# Patient Record
Sex: MALE | Race: WHITE | Employment: FULL TIME | URBAN - NONMETROPOLITAN AREA
[De-identification: names, ages, dates, MRNs, and addresses within clinical notes are randomized per-mention and may not be internally consistent; named-entity substitution may affect disease eponyms.]

---

## 2017-07-17 ENCOUNTER — OFFICE VISIT (OUTPATIENT)
Dept: FAMILY MEDICINE CLINIC | Age: 37
End: 2017-07-17
Payer: COMMERCIAL

## 2017-07-17 VITALS
SYSTOLIC BLOOD PRESSURE: 164 MMHG | WEIGHT: 315 LBS | HEART RATE: 80 BPM | DIASTOLIC BLOOD PRESSURE: 108 MMHG | HEIGHT: 69 IN | BODY MASS INDEX: 46.65 KG/M2

## 2017-07-17 DIAGNOSIS — E66.01 MORBID OBESITY WITH BMI OF 45.0-49.9, ADULT (HCC): ICD-10-CM

## 2017-07-17 DIAGNOSIS — M25.562 ACUTE PAIN OF LEFT KNEE: ICD-10-CM

## 2017-07-17 DIAGNOSIS — I10 ESSENTIAL HYPERTENSION: Primary | ICD-10-CM

## 2017-07-17 PROCEDURE — 99203 OFFICE O/P NEW LOW 30 MIN: CPT | Performed by: FAMILY MEDICINE

## 2017-07-17 RX ORDER — HYDROCHLOROTHIAZIDE 12.5 MG/1
12.5 TABLET ORAL DAILY
Qty: 90 TABLET | Refills: 0 | Status: SHIPPED | OUTPATIENT
Start: 2017-07-17 | End: 2017-07-27 | Stop reason: SDUPTHER

## 2017-07-17 ASSESSMENT — PATIENT HEALTH QUESTIONNAIRE - PHQ9
2. FEELING DOWN, DEPRESSED OR HOPELESS: 0
SUM OF ALL RESPONSES TO PHQ QUESTIONS 1-9: 0
1. LITTLE INTEREST OR PLEASURE IN DOING THINGS: 0
SUM OF ALL RESPONSES TO PHQ9 QUESTIONS 1 & 2: 0

## 2017-07-17 ASSESSMENT — ENCOUNTER SYMPTOMS
NAUSEA: 0
BLURRED VISION: 0
VOMITING: 0
SHORTNESS OF BREATH: 0
CHEST TIGHTNESS: 0
EYE REDNESS: 0

## 2017-07-22 LAB
ANION GAP SERPL CALCULATED.3IONS-SCNC: 12 MMOL/L
BUN BLDV-MCNC: 19 MG/DL (ref 10–20)
CALCIUM SERPL-MCNC: 10.1 MG/DL (ref 8.7–10.8)
CHLORIDE BLD-SCNC: 103 MMOL/L (ref 95–111)
CHOLESTEROL/HDL RATIO: 5.3
CHOLESTEROL: 203 MG/DL
CO2: 30 MMOL/L (ref 21–32)
CREAT SERPL-MCNC: 1 MG/DL (ref 0.5–1.3)
EGFR AFRICAN AMERICAN: 102
EGFR IF NONAFRICAN AMERICAN: 85
GLUCOSE: 93 MG/DL (ref 70–100)
HDLC SERPL-MCNC: 38 MG/DL (ref 40–60)
LDL CHOLESTEROL CALCULATED: 129 MG/DL
LDL/HDL RATIO: 3.4
POTASSIUM SERPL-SCNC: 4.5 MMOL/L (ref 3.5–5.4)
SODIUM BLD-SCNC: 140 MMOL/L (ref 134–147)
TRIGL SERPL-MCNC: 181 MG/DL
VLDLC SERPL CALC-MCNC: 36 MG/DL

## 2017-07-24 ENCOUNTER — TELEPHONE (OUTPATIENT)
Dept: FAMILY MEDICINE CLINIC | Age: 37
End: 2017-07-24

## 2017-07-27 ENCOUNTER — NURSE ONLY (OUTPATIENT)
Dept: FAMILY MEDICINE CLINIC | Age: 37
End: 2017-07-27

## 2017-07-27 VITALS — HEART RATE: 58 BPM | DIASTOLIC BLOOD PRESSURE: 96 MMHG | SYSTOLIC BLOOD PRESSURE: 148 MMHG

## 2017-07-27 DIAGNOSIS — Z01.30 BLOOD PRESSURE CHECK: Primary | ICD-10-CM

## 2017-07-27 DIAGNOSIS — I10 ESSENTIAL HYPERTENSION: ICD-10-CM

## 2017-07-27 RX ORDER — HYDROCHLOROTHIAZIDE 12.5 MG/1
25 TABLET ORAL DAILY
Qty: 90 TABLET | Refills: 0
Start: 2017-07-27 | End: 2017-08-28 | Stop reason: DRUGHIGH

## 2017-08-04 ENCOUNTER — NURSE ONLY (OUTPATIENT)
Dept: FAMILY MEDICINE CLINIC | Age: 37
End: 2017-08-04

## 2017-08-04 VITALS — SYSTOLIC BLOOD PRESSURE: 138 MMHG | DIASTOLIC BLOOD PRESSURE: 86 MMHG

## 2017-08-04 DIAGNOSIS — Z01.30 BLOOD PRESSURE CHECK: Primary | ICD-10-CM

## 2017-08-04 PROCEDURE — 99999 PR OFFICE/OUTPT VISIT,PROCEDURE ONLY: CPT | Performed by: FAMILY MEDICINE

## 2017-08-28 ENCOUNTER — NURSE ONLY (OUTPATIENT)
Dept: FAMILY MEDICINE CLINIC | Age: 37
End: 2017-08-28

## 2017-08-28 VITALS — DIASTOLIC BLOOD PRESSURE: 88 MMHG | SYSTOLIC BLOOD PRESSURE: 136 MMHG

## 2017-08-28 DIAGNOSIS — Z01.30 BLOOD PRESSURE CHECK: Primary | ICD-10-CM

## 2017-08-28 DIAGNOSIS — I10 ESSENTIAL HYPERTENSION: ICD-10-CM

## 2017-08-28 RX ORDER — HYDROCHLOROTHIAZIDE 25 MG/1
25 TABLET ORAL DAILY
COMMUNITY
End: 2017-08-28 | Stop reason: SDUPTHER

## 2017-08-28 RX ORDER — HYDROCHLOROTHIAZIDE 25 MG/1
25 TABLET ORAL DAILY
Qty: 90 TABLET | Refills: 1 | Status: SHIPPED | OUTPATIENT
Start: 2017-08-28 | End: 2017-12-04 | Stop reason: SDUPTHER

## 2017-12-04 RX ORDER — HYDROCHLOROTHIAZIDE 25 MG/1
25 TABLET ORAL DAILY
Qty: 30 TABLET | Refills: 0 | Status: SHIPPED | OUTPATIENT
Start: 2017-12-04 | End: 2018-01-02 | Stop reason: SDUPTHER

## 2017-12-04 NOTE — TELEPHONE ENCOUNTER
Poppy Layne called requesting a refill on the following medications:  Requested Prescriptions     Pending Prescriptions Disp Refills    hydrochlorothiazide (HYDRODIURIL) 25 MG tablet 90 tablet 1     Sig: Take 1 tablet by mouth daily     Pharmacy verified:  [email or web address]       Date of last visit: 07-17-17   Date of next visit (if applicable): Visit date not found

## 2017-12-18 ENCOUNTER — NURSE ONLY (OUTPATIENT)
Dept: FAMILY MEDICINE CLINIC | Age: 37
End: 2017-12-18

## 2017-12-18 VITALS — DIASTOLIC BLOOD PRESSURE: 86 MMHG | SYSTOLIC BLOOD PRESSURE: 136 MMHG

## 2017-12-18 DIAGNOSIS — Z01.30 BLOOD PRESSURE CHECK: Primary | ICD-10-CM

## 2017-12-18 NOTE — PROGRESS NOTES
Patient stopped for blood pressure check. States was elevated when went for appointment at sleep clinic. States get checked at work intermittently and usually in 130's 140's / 80's. he will continue to monitor at work. Will stop at office In several weeks to have rechecked.

## 2018-01-02 RX ORDER — HYDROCHLOROTHIAZIDE 25 MG/1
25 TABLET ORAL DAILY
Qty: 30 TABLET | Refills: 0 | Status: SHIPPED | OUTPATIENT
Start: 2018-01-02 | End: 2018-02-02

## 2018-02-02 ENCOUNTER — OFFICE VISIT (OUTPATIENT)
Dept: FAMILY MEDICINE CLINIC | Age: 38
End: 2018-02-02
Payer: COMMERCIAL

## 2018-02-02 VITALS
BODY MASS INDEX: 41.75 KG/M2 | WEIGHT: 315 LBS | HEART RATE: 78 BPM | DIASTOLIC BLOOD PRESSURE: 108 MMHG | HEIGHT: 73 IN | SYSTOLIC BLOOD PRESSURE: 140 MMHG

## 2018-02-02 DIAGNOSIS — I10 ESSENTIAL HYPERTENSION: Primary | ICD-10-CM

## 2018-02-02 DIAGNOSIS — E78.2 MIXED HYPERLIPIDEMIA: ICD-10-CM

## 2018-02-02 PROCEDURE — 99213 OFFICE O/P EST LOW 20 MIN: CPT | Performed by: FAMILY MEDICINE

## 2018-02-02 RX ORDER — LISINOPRIL AND HYDROCHLOROTHIAZIDE 25; 20 MG/1; MG/1
1 TABLET ORAL DAILY
Qty: 30 TABLET | Refills: 5 | Status: SHIPPED | OUTPATIENT
Start: 2018-02-02 | End: 2018-07-17 | Stop reason: SDUPTHER

## 2018-02-02 ASSESSMENT — ENCOUNTER SYMPTOMS
VOMITING: 0
CHEST TIGHTNESS: 0
NAUSEA: 0
SHORTNESS OF BREATH: 0

## 2018-02-02 NOTE — PROGRESS NOTES
(!) 160/100 Blood pressure is normal. Treatment plan consists of No treatment change needed. Lab Results   Component Value Date    LDLCALC 129 07/21/2017    (goal LDL reduction with dx if diabetes is 50% LDL reduction)      PHQ Scores 7/17/2017   PHQ2 Score 0   PHQ9 Score 0     Interpretation of Total Score Depression Severity: 1-4 = Minimal depression, 5-9 = Mild depression, 10-14 = Moderate depression, 15-19 = Moderately severe depression, 20-27 = Severe depression    Return in about 6 months (around 8/2/2018) for hypertension, hyperlipidemia.     Electronically signed by Blaze Bridges MD on 2/2/2018 at 10:38 AM

## 2018-02-02 NOTE — PATIENT INSTRUCTIONS
Patient Education        High Blood Pressure: Care Instructions  Your Care Instructions    If your blood pressure is usually above 140/90, you have high blood pressure, or hypertension. That means the top number is 140 or higher or the bottom number is 90 or higher, or both. Despite what a lot of people think, high blood pressure usually doesn't cause headaches or make you feel dizzy or lightheaded. It usually has no symptoms. But it does increase your risk for heart attack, stroke, and kidney or eye damage. The higher your blood pressure, the more your risk increases. Your doctor will give you a goal for your blood pressure. Your goal will be based on your health and your age. An example of a goal is to keep your blood pressure below 140/90. Lifestyle changes, such as eating healthy and being active, are always important to help lower blood pressure. You might also take medicine to reach your blood pressure goal.  Follow-up care is a key part of your treatment and safety. Be sure to make and go to all appointments, and call your doctor if you are having problems. It's also a good idea to know your test results and keep a list of the medicines you take. How can you care for yourself at home? Medical treatment  · If you stop taking your medicine, your blood pressure will go back up. You may take one or more types of medicine to lower your blood pressure. Be safe with medicines. Take your medicine exactly as prescribed. Call your doctor if you think you are having a problem with your medicine. · Talk to your doctor before you start taking aspirin every day. Aspirin can help certain people lower their risk of a heart attack or stroke. But taking aspirin isn't right for everyone, because it can cause serious bleeding. · See your doctor regularly. You may need to see the doctor more often at first or until your blood pressure comes down.   · If you are taking blood pressure medicine, talk to your doctor before you information.

## 2018-02-13 ENCOUNTER — NURSE ONLY (OUTPATIENT)
Dept: FAMILY MEDICINE CLINIC | Age: 38
End: 2018-02-13

## 2018-02-13 VITALS — DIASTOLIC BLOOD PRESSURE: 72 MMHG | SYSTOLIC BLOOD PRESSURE: 136 MMHG | HEART RATE: 60 BPM

## 2018-02-13 DIAGNOSIS — Z01.30 BLOOD PRESSURE CHECK: Primary | ICD-10-CM

## 2018-02-13 NOTE — PROGRESS NOTES
Stephanie Griffiths came in for a no-charge BP check. He states that at home he has been running in the mid 120's/ with the highest being 132/74. Here it was 136/78 - 60. He states he is tolerating the medication well. Dr. Concha Fernandez was notified. No new orders.

## 2018-07-17 DIAGNOSIS — I10 ESSENTIAL HYPERTENSION: ICD-10-CM

## 2018-07-17 RX ORDER — LISINOPRIL AND HYDROCHLOROTHIAZIDE 25; 20 MG/1; MG/1
1 TABLET ORAL DAILY
Qty: 30 TABLET | Refills: 5 | Status: SHIPPED | OUTPATIENT
Start: 2018-07-17 | End: 2019-01-09 | Stop reason: SDUPTHER

## 2018-07-17 NOTE — TELEPHONE ENCOUNTER
Mark López called requesting a refill on the following medications:  Requested Prescriptions     Pending Prescriptions Disp Refills    lisinopril-hydrochlorothiazide (PRINZIDE;ZESTORETIC) 20-25 MG per tablet [Pharmacy Med Name: LISINOPRIL-HCTZ 20-25 MG TAB] 30 tablet 5     Sig: TAKE 1 TABLET BY MOUTH DAILY       Date of last visit: 2/2/2018  Date of next visit (if applicable):Visit date not found  Date of last refill: 02/02/2018  Pharmacy Name: [facility]      Sharad Little, [address] [place] [address]

## 2019-03-01 ENCOUNTER — OFFICE VISIT (OUTPATIENT)
Dept: FAMILY MEDICINE CLINIC | Age: 39
End: 2019-03-01
Payer: COMMERCIAL

## 2019-03-01 VITALS
SYSTOLIC BLOOD PRESSURE: 138 MMHG | WEIGHT: 315 LBS | BODY MASS INDEX: 41.75 KG/M2 | HEART RATE: 71 BPM | DIASTOLIC BLOOD PRESSURE: 86 MMHG | HEIGHT: 73 IN

## 2019-03-01 DIAGNOSIS — I10 ESSENTIAL HYPERTENSION: Primary | ICD-10-CM

## 2019-03-01 PROCEDURE — G8427 DOCREV CUR MEDS BY ELIG CLIN: HCPCS | Performed by: FAMILY MEDICINE

## 2019-03-01 PROCEDURE — G8417 CALC BMI ABV UP PARAM F/U: HCPCS | Performed by: FAMILY MEDICINE

## 2019-03-01 PROCEDURE — G8484 FLU IMMUNIZE NO ADMIN: HCPCS | Performed by: FAMILY MEDICINE

## 2019-03-01 PROCEDURE — 99213 OFFICE O/P EST LOW 20 MIN: CPT | Performed by: FAMILY MEDICINE

## 2019-03-01 PROCEDURE — 1036F TOBACCO NON-USER: CPT | Performed by: FAMILY MEDICINE

## 2019-03-01 RX ORDER — LISINOPRIL AND HYDROCHLOROTHIAZIDE 25; 20 MG/1; MG/1
1 TABLET ORAL DAILY
Qty: 90 TABLET | Refills: 1 | Status: SHIPPED | OUTPATIENT
Start: 2019-03-01 | End: 2019-08-25 | Stop reason: SDUPTHER

## 2019-03-01 ASSESSMENT — PATIENT HEALTH QUESTIONNAIRE - PHQ9
SUM OF ALL RESPONSES TO PHQ QUESTIONS 1-9: 0
2. FEELING DOWN, DEPRESSED OR HOPELESS: 0
SUM OF ALL RESPONSES TO PHQ QUESTIONS 1-9: 0
1. LITTLE INTEREST OR PLEASURE IN DOING THINGS: 0
SUM OF ALL RESPONSES TO PHQ9 QUESTIONS 1 & 2: 0

## 2019-03-01 ASSESSMENT — ENCOUNTER SYMPTOMS
CHEST TIGHTNESS: 0
NAUSEA: 0
VOMITING: 0

## 2019-03-13 ENCOUNTER — TELEPHONE (OUTPATIENT)
Dept: FAMILY MEDICINE CLINIC | Age: 39
End: 2019-03-13

## 2019-03-13 ENCOUNTER — NURSE ONLY (OUTPATIENT)
Dept: FAMILY MEDICINE CLINIC | Age: 39
End: 2019-03-13
Payer: COMMERCIAL

## 2019-03-13 DIAGNOSIS — I10 ESSENTIAL HYPERTENSION: ICD-10-CM

## 2019-03-13 LAB
ANION GAP SERPL CALCULATED.3IONS-SCNC: 10 MEQ/L (ref 8–16)
BUN BLDV-MCNC: 12 MG/DL (ref 7–22)
CALCIUM SERPL-MCNC: 10.1 MG/DL (ref 8.5–10.5)
CHLORIDE BLD-SCNC: 97 MEQ/L (ref 98–111)
CHOLESTEROL, TOTAL: 197 MG/DL (ref 100–199)
CO2: 31 MEQ/L (ref 23–33)
CREAT SERPL-MCNC: 1 MG/DL (ref 0.4–1.2)
GFR SERPL CREATININE-BSD FRML MDRD: 84 ML/MIN/1.73M2
GLUCOSE BLD-MCNC: 101 MG/DL (ref 70–108)
HDLC SERPL-MCNC: 45 MG/DL
LDL CHOLESTEROL CALCULATED: 113 MG/DL
POTASSIUM SERPL-SCNC: 4.4 MEQ/L (ref 3.5–5.2)
SODIUM BLD-SCNC: 138 MEQ/L (ref 135–145)
TRIGL SERPL-MCNC: 194 MG/DL (ref 0–199)

## 2019-03-13 PROCEDURE — 36415 COLL VENOUS BLD VENIPUNCTURE: CPT | Performed by: FAMILY MEDICINE

## 2019-08-25 DIAGNOSIS — I10 ESSENTIAL HYPERTENSION: ICD-10-CM

## 2019-08-26 RX ORDER — LISINOPRIL AND HYDROCHLOROTHIAZIDE 25; 20 MG/1; MG/1
TABLET ORAL
Qty: 90 TABLET | Refills: 1 | Status: SHIPPED | OUTPATIENT
Start: 2019-08-26 | End: 2019-08-28

## 2019-08-28 ENCOUNTER — OFFICE VISIT (OUTPATIENT)
Dept: FAMILY MEDICINE CLINIC | Age: 39
End: 2019-08-28
Payer: COMMERCIAL

## 2019-08-28 VITALS
BODY MASS INDEX: 41.35 KG/M2 | DIASTOLIC BLOOD PRESSURE: 92 MMHG | HEART RATE: 86 BPM | HEIGHT: 73 IN | WEIGHT: 312 LBS | SYSTOLIC BLOOD PRESSURE: 136 MMHG

## 2019-08-28 DIAGNOSIS — I10 ESSENTIAL HYPERTENSION: Primary | ICD-10-CM

## 2019-08-28 PROCEDURE — G8417 CALC BMI ABV UP PARAM F/U: HCPCS | Performed by: FAMILY MEDICINE

## 2019-08-28 PROCEDURE — 99213 OFFICE O/P EST LOW 20 MIN: CPT | Performed by: FAMILY MEDICINE

## 2019-08-28 PROCEDURE — 1036F TOBACCO NON-USER: CPT | Performed by: FAMILY MEDICINE

## 2019-08-28 PROCEDURE — G8427 DOCREV CUR MEDS BY ELIG CLIN: HCPCS | Performed by: FAMILY MEDICINE

## 2019-08-28 RX ORDER — HYDROCHLOROTHIAZIDE 25 MG/1
25 TABLET ORAL DAILY
Qty: 90 TABLET | Refills: 1 | Status: SHIPPED | OUTPATIENT
Start: 2019-08-28 | End: 2020-02-24 | Stop reason: SDUPTHER

## 2019-08-28 RX ORDER — LISINOPRIL 40 MG/1
40 TABLET ORAL DAILY
Qty: 90 TABLET | Refills: 1 | Status: SHIPPED | OUTPATIENT
Start: 2019-08-28 | End: 2020-02-24 | Stop reason: SDUPTHER

## 2019-08-28 ASSESSMENT — ENCOUNTER SYMPTOMS
CHEST TIGHTNESS: 0
VOMITING: 0
NAUSEA: 0

## 2019-08-28 NOTE — PATIENT INSTRUCTIONS
Patient Education        Low Sodium Diet: Care Instructions  Your Care Instructions    Too much sodium causes your body to hold on to extra water. This can raise your blood pressure and force your heart and kidneys to work harder. In very serious cases, this could cause you to be put in the hospital. It might even be life-threatening. By limiting sodium, you will feel better and lower your risk of serious problems. The most common source of sodium is salt. People get most of the salt in their diet from canned, prepared, and packaged foods. Fast food and restaurant meals also are very high in sodium. Your doctor will probably limit your sodium to less than 2,000 milligrams (mg) a day. This limit counts all the sodium in prepared and packaged foods and any salt you add to your food. Follow-up care is a key part of your treatment and safety. Be sure to make and go to all appointments, and call your doctor if you are having problems. It's also a good idea to know your test results and keep a list of the medicines you take. How can you care for yourself at home? Read food labels  · Read labels on cans and food packages. The labels tell you how much sodium is in each serving. Make sure that you look at the serving size. If you eat more than the serving size, you have eaten more sodium. · Food labels also tell you the Percent Daily Value for sodium. Choose products with low Percent Daily Values for sodium. · Be aware that sodium can come in forms other than salt, including monosodium glutamate (MSG), sodium citrate, and sodium bicarbonate (baking soda). MSG is often added to Asian food. When you eat out, you can sometimes ask for food without MSG or added salt. Buy low-sodium foods  · Buy foods that are labeled \"unsalted\" (no salt added), \"sodium-free\" (less than 5 mg of sodium per serving), or \"low-sodium\" (less than 140 mg of sodium per serving).  Foods labeled \"reduced-sodium\" and \"light sodium\" may still have too

## 2019-08-28 NOTE — PROGRESS NOTES
[address] [place] [address] Drive, [facility]  Phone:  [phone]  Fax:  [phone]       Name: Raisa Topete  : 1980    Chief Complaint   Patient presents with    6 Month Follow-Up    Hypertension       HPI:     Raisa Topete is a 46 y.o. male who presents today for follow-up of hypertension. His last BMP in 2019 was WNL. His lipid profile was controlled at that time. He checks his BP at home and the diastolic has been elevated in the 90s. Michaelyn Daily He denies any acute issues today. Hypertension   This is a chronic problem. The current episode started more than 1 year ago. The problem is uncontrolled. Pertinent negatives include no headaches, palpitations or peripheral edema. There are no associated agents to hypertension. Risk factors for coronary artery disease include male gender. Past treatments include diuretics. The current treatment provides moderate improvement. There are no compliance problems. Current Outpatient Medications:     lisinopril (PRINIVIL;ZESTRIL) 40 MG tablet, Take 1 tablet by mouth daily, Disp: 90 tablet, Rfl: 1    hydrochlorothiazide (HYDRODIURIL) 25 MG tablet, Take 1 tablet by mouth daily, Disp: 90 tablet, Rfl: 1    Allergies   Allergen Reactions    Penicillins Hives       Subjective:      Review of Systems   Constitutional: Negative for chills and fever. Respiratory: Negative for chest tightness. Cardiovascular: Negative for palpitations. Gastrointestinal: Negative for nausea and vomiting. Neurological: Negative for dizziness and headaches. Objective:     BP (!) 136/92 (Site: Right Upper Arm, Position: Sitting, Cuff Size: Large Adult)   Pulse 86   Ht 6' 1\" (1.854 m)   Wt (!) 312 lb (141.5 kg)   BMI 41.16 kg/m²     Physical Exam   Constitutional: He is oriented to person, place, and time. He appears well-developed and well-nourished. No distress. HENT:   Head: Normocephalic and atraumatic.    Nose: Nose normal. Eyes: Conjunctivae and EOM are normal.   Neck: Normal range of motion. Neck supple. Cardiovascular: Normal rate and regular rhythm. Pulmonary/Chest: Effort normal and breath sounds normal. No respiratory distress. He has no wheezes. Abdominal: Soft. Bowel sounds are normal.   Neurological: He is alert and oriented to person, place, and time. Skin: Skin is warm and dry. No rash noted. Psychiatric: He has a normal mood and affect. His behavior is normal.   Vitals reviewed. Assessment/Plan:     Ana Lilia Asencio was seen today for 6 month follow-up and hypertension. Diagnoses and all orders for this visit:    Essential hypertension  -     Blood pressure is mildly elevated today, but has been at home as well so will split Lisinopril-HCTZ from 20-25 mg to Lisinopril 40 mg and HCTZ 25 mg. He will continue to check his BP at home and will contact our office if it drops too low. -     lisinopril (PRINIVIL;ZESTRIL) 40 MG tablet; Take 1 tablet by mouth daily  -     hydrochlorothiazide (HYDRODIURIL) 25 MG tablet; Take 1 tablet by mouth daily      Return in about 6 months (around 2/28/2020) for hypertension.     Electronically signed by Miracle Reyes MD on 8/28/2019 at 9:52 AM

## 2020-02-24 RX ORDER — HYDROCHLOROTHIAZIDE 25 MG/1
25 TABLET ORAL DAILY
Qty: 30 TABLET | Refills: 0 | Status: SHIPPED | OUTPATIENT
Start: 2020-02-24 | End: 2020-03-12 | Stop reason: SDUPTHER

## 2020-02-24 RX ORDER — LISINOPRIL 40 MG/1
40 TABLET ORAL DAILY
Qty: 30 TABLET | Refills: 0 | Status: SHIPPED | OUTPATIENT
Start: 2020-02-24 | End: 2020-03-12 | Stop reason: SDUPTHER

## 2020-03-12 ENCOUNTER — OFFICE VISIT (OUTPATIENT)
Dept: FAMILY MEDICINE CLINIC | Age: 40
End: 2020-03-12
Payer: COMMERCIAL

## 2020-03-12 VITALS
DIASTOLIC BLOOD PRESSURE: 84 MMHG | OXYGEN SATURATION: 98 % | WEIGHT: 315 LBS | HEIGHT: 73 IN | SYSTOLIC BLOOD PRESSURE: 130 MMHG | BODY MASS INDEX: 41.75 KG/M2 | HEART RATE: 72 BPM

## 2020-03-12 PROCEDURE — 99213 OFFICE O/P EST LOW 20 MIN: CPT | Performed by: FAMILY MEDICINE

## 2020-03-12 PROCEDURE — 1036F TOBACCO NON-USER: CPT | Performed by: FAMILY MEDICINE

## 2020-03-12 PROCEDURE — G8417 CALC BMI ABV UP PARAM F/U: HCPCS | Performed by: FAMILY MEDICINE

## 2020-03-12 PROCEDURE — G8427 DOCREV CUR MEDS BY ELIG CLIN: HCPCS | Performed by: FAMILY MEDICINE

## 2020-03-12 PROCEDURE — G8484 FLU IMMUNIZE NO ADMIN: HCPCS | Performed by: FAMILY MEDICINE

## 2020-03-12 RX ORDER — LISINOPRIL 40 MG/1
40 TABLET ORAL DAILY
Qty: 90 TABLET | Refills: 1 | Status: SHIPPED | OUTPATIENT
Start: 2020-03-12 | End: 2020-09-17

## 2020-03-12 RX ORDER — HYDROCHLOROTHIAZIDE 25 MG/1
25 TABLET ORAL DAILY
Qty: 90 TABLET | Refills: 1 | Status: SHIPPED | OUTPATIENT
Start: 2020-03-12 | End: 2020-09-17

## 2020-03-12 ASSESSMENT — ENCOUNTER SYMPTOMS
NAUSEA: 0
CHEST TIGHTNESS: 0
VOMITING: 0

## 2020-04-23 ASSESSMENT — PATIENT HEALTH QUESTIONNAIRE - PHQ9
2. FEELING DOWN, DEPRESSED OR HOPELESS: 0
SUM OF ALL RESPONSES TO PHQ QUESTIONS 1-9: 0
SUM OF ALL RESPONSES TO PHQ QUESTIONS 1-9: 0
1. LITTLE INTEREST OR PLEASURE IN DOING THINGS: 0
SUM OF ALL RESPONSES TO PHQ9 QUESTIONS 1 & 2: 0

## 2020-09-17 RX ORDER — HYDROCHLOROTHIAZIDE 25 MG/1
TABLET ORAL
Qty: 90 TABLET | Refills: 1 | Status: SHIPPED | OUTPATIENT
Start: 2020-09-17 | End: 2021-03-16 | Stop reason: SDUPTHER

## 2020-09-17 RX ORDER — LISINOPRIL 40 MG/1
TABLET ORAL
Qty: 90 TABLET | Refills: 1 | Status: SHIPPED | OUTPATIENT
Start: 2020-09-17 | End: 2021-03-16 | Stop reason: SDUPTHER

## 2020-10-15 ASSESSMENT — ENCOUNTER SYMPTOMS
NAUSEA: 0
VOMITING: 0
COUGH: 0
SHORTNESS OF BREATH: 0

## 2020-10-15 NOTE — PROGRESS NOTES
[address] [place] [address] Rd, [address], [place]  Phone:  [phone]  OYZ:[phone]       Name: Sonal Haney  : 1980    Chief Complaint   Patient presents with    Personal Problem       HPI:     Sonal Haney is a 45 y.o. male who presents today to discuss an STI. His girlfriend of over 1 year recently had a full STI panel and was diagnosed with chlamydia. She was treated and was told her partner needs to be treated as well before she has intercourse. Adalgisa Gold denies any current symptoms including penile discharge. No penile lesions. He denies ever having an STI in the past.      Current Outpatient Medications:     azithromycin (ZITHROMAX) 500 MG tablet, Take 2 tablets by mouth once for 1 dose, Disp: 2 tablet, Rfl: 0    lisinopril (PRINIVIL;ZESTRIL) 40 MG tablet, TAKE 1 TABLET BY MOUTH EVERY DAY, Disp: 90 tablet, Rfl: 1    hydroCHLOROthiazide (HYDRODIURIL) 25 MG tablet, TAKE 1 TABLET BY MOUTH EVERY DAY, Disp: 90 tablet, Rfl: 1    Allergies   Allergen Reactions    Penicillins Hives       Subjective:      Review of Systems   Constitutional: Negative for chills and fever. Respiratory: Negative for cough and shortness of breath. Gastrointestinal: Negative for nausea and vomiting. Genitourinary: Negative for difficulty urinating, discharge, penile pain and penile swelling. Objective:     /86 (Site: Left Upper Arm, Position: Sitting, Cuff Size: Large Adult)   Temp 97.2 °F (36.2 °C)   Ht 5' 9\" (1.753 m)   Wt (!) 328 lb (148.8 kg)   BMI 48.44 kg/m²     Physical Exam  Vitals signs and nursing note reviewed. Constitutional:       Appearance: He is well-developed. HENT:      Head: Normocephalic and atraumatic. Eyes:      Conjunctiva/sclera: Conjunctivae normal.   Neck:      Musculoskeletal: Normal range of motion and neck supple. Cardiovascular:      Rate and Rhythm: Normal rate and regular rhythm. Heart sounds: Normal heart sounds.    Pulmonary: Effort: Pulmonary effort is normal. No respiratory distress. Breath sounds: Normal breath sounds. Abdominal:      General: Bowel sounds are normal.      Palpations: Abdomen is soft. Skin:     General: Skin is warm and dry. Neurological:      Mental Status: He is alert and oriented to person, place, and time. Motor: No abnormal muscle tone. Assessment/Plan:     Jessica Massey was seen today for personal problem. Diagnoses and all orders for this visit:    Chlamydia contact  -     His girlfriend was recently diagnosed with and treated for Chlamydia so will treat him as well. He was advised to abstain from sex for a few days after treatment to ensure it's cleared. He declined further STI testing today. -     azithromycin (ZITHROMAX) 500 MG tablet; Take 2 tablets by mouth once for 1 dose      Return if symptoms worsen or fail to improve.     Electronically signed by Sean Otero MD on 10/16/2020 at 10:27 AM

## 2020-10-16 ENCOUNTER — OFFICE VISIT (OUTPATIENT)
Dept: FAMILY MEDICINE CLINIC | Age: 40
End: 2020-10-16
Payer: COMMERCIAL

## 2020-10-16 VITALS
SYSTOLIC BLOOD PRESSURE: 134 MMHG | WEIGHT: 315 LBS | DIASTOLIC BLOOD PRESSURE: 86 MMHG | BODY MASS INDEX: 46.65 KG/M2 | TEMPERATURE: 97.2 F | HEIGHT: 69 IN

## 2020-10-16 PROCEDURE — 99213 OFFICE O/P EST LOW 20 MIN: CPT | Performed by: FAMILY MEDICINE

## 2020-10-16 PROCEDURE — G8427 DOCREV CUR MEDS BY ELIG CLIN: HCPCS | Performed by: FAMILY MEDICINE

## 2020-10-16 PROCEDURE — G8417 CALC BMI ABV UP PARAM F/U: HCPCS | Performed by: FAMILY MEDICINE

## 2020-10-16 PROCEDURE — G8484 FLU IMMUNIZE NO ADMIN: HCPCS | Performed by: FAMILY MEDICINE

## 2020-10-16 PROCEDURE — 1036F TOBACCO NON-USER: CPT | Performed by: FAMILY MEDICINE

## 2020-10-16 RX ORDER — AZITHROMYCIN 500 MG/1
1000 TABLET, FILM COATED ORAL ONCE
Qty: 2 TABLET | Refills: 0 | Status: SHIPPED | OUTPATIENT
Start: 2020-10-16 | End: 2020-10-16

## 2020-10-16 NOTE — PATIENT INSTRUCTIONS
Patient Education        Chlamydia: Care Instructions  Your Care Instructions  Chlamydia is a bacterial infection spread through sexual contact. It is one of the most common sexually transmitted infections (STIs). Most people who get chlamydia do not have symptoms, but they can still infect their sex partners. If chlamydia in women is not treated, it can cause pelvic inflammatory disease (PID), a severe pelvic infection. PID can make it hard for a woman to get pregnant. Antibiotics can cure chlamydia. Both sex partners need treatment to keep from passing the infection back and forth. Certain antibiotics should not be used in pregnancy. If you were not tested for pregnancy during this visit, tell your doctor if you might be pregnant. Follow-up care is a key part of your treatment and safety. Be sure to make and go to all appointments, and call your doctor if you are having problems. It's also a good idea to know your test results and keep a list of the medicines you take. How can you care for yourself at home? · Chlamydia often is treated with a single dose of antibiotics in the doctor's office. If your doctor prescribed antibiotics to take at home, take them as directed. Do not stop taking them just because you feel better. You need to take the full course of antibiotics. · Do not have sex with anyone while you are being treated. If your treatment is a single dose of antibiotics, wait at least 7 days after you take the dose before you have sex. Even if you use a condom, you and your partner may pass the infection back and forth. · Make sure to tell your sex partner or partners that you have chlamydia. They should get treated, even if they do not have symptoms. · Your doctor may have done tests for other STIs. If so, call back in 3 or 4 days for those results. · Your doctor may advise you to be tested again for chlamydia in 3 or 4 months. How can you prevent chlamydia and other STIs in the future?   · Use

## 2020-11-02 ENCOUNTER — VIRTUAL VISIT (OUTPATIENT)
Dept: FAMILY MEDICINE CLINIC | Age: 40
End: 2020-11-02
Payer: COMMERCIAL

## 2020-11-02 PROCEDURE — G8428 CUR MEDS NOT DOCUMENT: HCPCS | Performed by: FAMILY MEDICINE

## 2020-11-02 PROCEDURE — 99213 OFFICE O/P EST LOW 20 MIN: CPT | Performed by: FAMILY MEDICINE

## 2020-11-02 ASSESSMENT — ENCOUNTER SYMPTOMS
SHORTNESS OF BREATH: 0
RHINORRHEA: 0
COUGH: 1
CHEST TIGHTNESS: 0

## 2020-11-02 NOTE — PROGRESS NOTES
[address] [place] [address] Rd, [address], [place]  Phone:  [phone]  Fax:  [phone]    2020    TELEHEALTH EVALUATION -- Audio/Visual (During PFOPA-58 public health emergency)    HPI:    Deon Cox (:  1980) has requested an audio/video evaluation for the following concern(s): fatigue, loss of taste    Gabi Silva has had fatigue for the past few days. Yesterday he lost his sense of taste and smell. Today he has a dry cough. No SOB or chest tightness. He has mild nasal congestion. No fevers. No known COVID exposures. He works at [employer]. Review of Systems   Constitutional: Positive for fatigue. Negative for chills and fever. HENT: Positive for congestion. Negative for postnasal drip and rhinorrhea. Respiratory: Positive for cough. Negative for chest tightness and shortness of breath. Prior to Visit Medications    Medication Sig Taking? Authorizing Provider   lisinopril (PRINIVIL;ZESTRIL) 40 MG tablet TAKE 1 TABLET BY MOUTH EVERY DAY  El Dominguez MD   hydroCHLOROthiazide (HYDRODIURIL) 25 MG tablet TAKE 1 TABLET BY MOUTH EVERY DAY  El Dominguez MD       Social History     Tobacco Use    Smoking status: Never Smoker    Smokeless tobacco: Never Used   Substance Use Topics    Alcohol use: Yes     Comment: socially    Drug use: No        Allergies   Allergen Reactions    Penicillins Hives   ,   Past Medical History:   Diagnosis Date    Hypertension        PHYSICAL EXAMINATION:    Constitutional: [x] Appears well-developed and well-nourished [x] No apparent distress      [] Abnormal-   Mental status  [x] Alert and awake  [] Oriented to person/place/time []Able to follow commands      Eyes:  EOM    [x]  Normal  [] Abnormal-  Sclera  [x]  Normal  [] Abnormal -         Discharge [x]  None visible  [] Abnormal -    HENT:   [x] Normocephalic, atraumatic.   [] Abnormal   [x] Mouth/Throat: Mucous membranes are moist.     External Ears [] Normal  [] Abnormal-     Neck: [] No visualized mass     Pulmonary/Chest: [x] Respiratory effort normal.  [x] No visualized signs of difficulty breathing or respiratory distress        [] Abnormal-      Musculoskeletal:   [] Normal gait with no signs of ataxia         [] Normal range of motion of neck        [] Abnormal-       Neurological:        [] No Facial Asymmetry (Cranial nerve 7 motor function) (limited exam to video visit)          [] No gaze palsy        [] Abnormal-         Skin:        [] No significant exanthematous lesions or discoloration noted on facial skin         [] Abnormal-            Psychiatric:       [] Normal Affect [] No Hallucinations        [] Abnormal-       ASSESSMENT/PLAN:  1. Loss of taste  2. Loss of smell  3. Cough  - Given his symptoms, will test for COVID-19. Order was faxed to [facility]. He was advised to rest, quarantine, increase hydration, and use OTC symptomatic medication.  - COVID-19 Ambulatory; Future      Return if symptoms worsen or fail to improve. Jason Sood is a 45 y.o. male being evaluated by a Virtual Visit (video visit) encounter to address concerns as mentioned above. A caregiver was present when appropriate. Due to this being a TeleHealth encounter (During [address] emergency), evaluation of the following organ systems was limited: Vitals/Constitutional/EENT/Resp/CV/GI//MS/Neuro/Skin/Heme-Lymph-Imm. Pursuant to the emergency declaration under the [address] [place] [address], [address] [place] [address] authority and the [employer] Act, this Virtual Visit was conducted with patient's (and/or legal guardian's) consent, to reduce the patient's risk of exposure to COVID-19 and provide necessary medical care. The patient (and/or legal guardian) has also been advised to contact this office for worsening conditions or problems, and seek emergency medical treatment and/or call 911 if deemed necessary. Patient identification was verified at the start of the visit: Yes    Services were provided through a video synchronous discussion virtually to substitute for in-person clinic visit. Patient and provider were located at their individual homes. --Jessica Little MD on 11/2/2020 at 8:39 AM    An electronic signature was used to authenticate this note.

## 2020-11-03 ENCOUNTER — TELEPHONE (OUTPATIENT)
Dept: FAMILY MEDICINE CLINIC | Age: 40
End: 2020-11-03

## 2020-11-03 LAB — SARS-COV-2: DETECTED

## 2020-11-03 NOTE — TELEPHONE ENCOUNTER
----- Message from Antonietta Youngblood MD sent at 11/3/2020  3:59 PM EST -----  Please inform patient of positive result and need to quarantine for 10 days from symptom onset.

## 2021-03-16 ENCOUNTER — TELEPHONE (OUTPATIENT)
Dept: FAMILY MEDICINE CLINIC | Age: 41
End: 2021-03-16

## 2021-03-16 DIAGNOSIS — I10 ESSENTIAL HYPERTENSION: ICD-10-CM

## 2021-03-16 RX ORDER — LISINOPRIL 40 MG/1
TABLET ORAL
Qty: 10 TABLET | Refills: 0 | Status: SHIPPED | OUTPATIENT
Start: 2021-03-16 | End: 2021-03-23 | Stop reason: SDUPTHER

## 2021-03-16 RX ORDER — HYDROCHLOROTHIAZIDE 25 MG/1
TABLET ORAL
Qty: 10 TABLET | Refills: 0 | Status: SHIPPED | OUTPATIENT
Start: 2021-03-16 | End: 2021-03-23 | Stop reason: SDUPTHER

## 2021-03-16 NOTE — TELEPHONE ENCOUNTER
[facility] received a call from:    Name of Caller: parviz rodrigez    Relationship to patient: self    Organization name: none    Best contact number: [phone]    Reason for call: pt called to request appointment for medication refills, soonest one available for Dr. Farzana Adamson was 03/23 - parviz states he will be out of medication by then, requesting earlier appointment if possible, would like to be contacted to discuss

## 2021-03-16 NOTE — TELEPHONE ENCOUNTER
Patient called for appointment because prescriptions are out. I have pended a 10 day supply and made an appointment for  March 23 at 11:30. Spoke with patient and he has agreed to this.

## 2021-03-22 ASSESSMENT — ENCOUNTER SYMPTOMS
VOMITING: 0
NAUSEA: 0
CHEST TIGHTNESS: 0

## 2021-03-22 NOTE — PROGRESS NOTES
[address] [place] [address] Drive, [facility] [address], [place]  Phone:  [phone]  Fax:  [phone]       Name: Candice Shah  : 1980    Chief Complaint   Patient presents with    6 Month Follow-Up     left ear plugged    had some sinus congestion 1.5 weeks ago      Hypertension       HPI:     Candice Sahh is a 45 y.o. male who presents today for follow-up of hypertension. He has had some sinus congestion and left ear fullness for the past 1.5 weeks. No fevers. No cough. Hypertension  This is a chronic problem. The current episode started more than 1 year ago. The problem is uncontrolled. Pertinent negatives include no headaches, palpitations or peripheral edema. There are no associated agents to hypertension. Risk factors for coronary artery disease include male gender. Past treatments include diuretics. The current treatment provides moderate improvement. There are no compliance problems. Lab Results   Component Value Date     2019    K 4.4 2019    CL 97 (L) 2019    CO2 31 2019    BUN 12 2019    CREATININE 1.0 2019    GLUCOSE 101 2019    CALCIUM 10.1 2019    LABGLOM 84 (A) 2019     Lab Results   Component Value Date    CHOL 197 2019    TRIG 194 2019    HDL 45 2019    LDLCALC 113 2019     No results found for: ALT, AST         Current Outpatient Medications:     lisinopril (PRINIVIL;ZESTRIL) 40 MG tablet, TAKE 1 TABLET BY MOUTH EVERY DAY, Disp: 10 tablet, Rfl: 0    hydroCHLOROthiazide (HYDRODIURIL) 25 MG tablet, TAKE 1 TABLET BY MOUTH EVERY DAY, Disp: 10 tablet, Rfl: 0    Allergies   Allergen Reactions    Penicillins Hives       Subjective:      Review of Systems   Constitutional: Negative for chills and fever. HENT: Positive for congestion, ear pain (fullness) and sinus pressure. Respiratory: Negative for chest tightness. Cardiovascular: Negative for palpitations.    Gastrointestinal: Negative for nausea and vomiting. Neurological: Negative for dizziness and headaches. Objective:     /80 (Site: Left Upper Arm, Position: Sitting, Cuff Size: Large Adult)   Pulse 76   Ht 5' 9\" (1.753 m)   Wt (!) 340 lb (154.2 kg)   SpO2 96%   BMI 50.21 kg/m²     Physical Exam  Vitals signs reviewed. Constitutional:       General: He is not in acute distress. Appearance: He is well-developed. HENT:      Head: Normocephalic and atraumatic. Right Ear: Tympanic membrane, ear canal and external ear normal.      Left Ear: A middle ear effusion is present. Tympanic membrane is erythematous. Nose: Congestion present. Eyes:      Conjunctiva/sclera: Conjunctivae normal.   Neck:      Musculoskeletal: Normal range of motion and neck supple. Cardiovascular:      Rate and Rhythm: Normal rate and regular rhythm. Pulmonary:      Effort: Pulmonary effort is normal. No respiratory distress. Breath sounds: Normal breath sounds. No wheezing. Abdominal:      General: Bowel sounds are normal.      Palpations: Abdomen is soft. Skin:     General: Skin is warm and dry. Findings: No rash. Neurological:      Mental Status: He is alert and oriented to person, place, and time. Psychiatric:         Behavior: Behavior normal.       Assessment/Plan:     Ulices Rodrigez was seen today for 6 month follow-up and hypertension. Diagnoses and all orders for this visit:    Essential hypertension  -     Blood pressure is controlled so will continue on current medications. Will check routine labs. -     Lipid Panel; Future  -     Basic Metabolic Panel; Future  -     lisinopril (PRINIVIL;ZESTRIL) 40 MG tablet; TAKE 1 TABLET BY MOUTH EVERY DAY  -     hydroCHLOROthiazide (HYDRODIURIL) 25 MG tablet; TAKE 1 TABLET BY MOUTH EVERY DAY    Acute bacterial sinusitis  -     Symptoms are consistent with sinusitis (he's recently had COVID-19).   Will treat with OTC sinus medication including Flonase as well as antibiotics. -     azithromycin (ZITHROMAX Z-EVERARDO) 250 MG tablet; Take two (2) tablets by mouth on day 1, then take one (1) tablet by mouth daily for four (4) days. Return in about 6 months (around 9/23/2021) for hypertension.     Electronically signed by Elsa Estes MD on 3/23/2021 at 11:45 AM

## 2021-03-23 ENCOUNTER — OFFICE VISIT (OUTPATIENT)
Dept: FAMILY MEDICINE CLINIC | Age: 41
End: 2021-03-23
Payer: COMMERCIAL

## 2021-03-23 VITALS
SYSTOLIC BLOOD PRESSURE: 124 MMHG | WEIGHT: 315 LBS | HEART RATE: 76 BPM | OXYGEN SATURATION: 96 % | DIASTOLIC BLOOD PRESSURE: 80 MMHG | BODY MASS INDEX: 46.65 KG/M2 | HEIGHT: 69 IN

## 2021-03-23 DIAGNOSIS — B96.89 ACUTE BACTERIAL SINUSITIS: ICD-10-CM

## 2021-03-23 DIAGNOSIS — J01.90 ACUTE BACTERIAL SINUSITIS: ICD-10-CM

## 2021-03-23 DIAGNOSIS — I10 ESSENTIAL HYPERTENSION: Primary | ICD-10-CM

## 2021-03-23 PROCEDURE — G8484 FLU IMMUNIZE NO ADMIN: HCPCS | Performed by: FAMILY MEDICINE

## 2021-03-23 PROCEDURE — G8417 CALC BMI ABV UP PARAM F/U: HCPCS | Performed by: FAMILY MEDICINE

## 2021-03-23 PROCEDURE — G8427 DOCREV CUR MEDS BY ELIG CLIN: HCPCS | Performed by: FAMILY MEDICINE

## 2021-03-23 PROCEDURE — 99214 OFFICE O/P EST MOD 30 MIN: CPT | Performed by: FAMILY MEDICINE

## 2021-03-23 PROCEDURE — 1036F TOBACCO NON-USER: CPT | Performed by: FAMILY MEDICINE

## 2021-03-23 RX ORDER — HYDROCHLOROTHIAZIDE 25 MG/1
TABLET ORAL
Qty: 90 TABLET | Refills: 1 | Status: SHIPPED | OUTPATIENT
Start: 2021-03-23 | End: 2021-10-07 | Stop reason: SDUPTHER

## 2021-03-23 RX ORDER — LISINOPRIL 40 MG/1
TABLET ORAL
Qty: 90 TABLET | Refills: 1 | Status: SHIPPED | OUTPATIENT
Start: 2021-03-23 | End: 2021-10-07 | Stop reason: SDUPTHER

## 2021-03-23 RX ORDER — AZITHROMYCIN 250 MG/1
TABLET, FILM COATED ORAL
Qty: 6 TABLET | Refills: 0 | Status: SHIPPED | OUTPATIENT
Start: 2021-03-23 | End: 2021-10-07

## 2021-03-23 ASSESSMENT — ENCOUNTER SYMPTOMS: SINUS PRESSURE: 1

## 2021-03-23 ASSESSMENT — PATIENT HEALTH QUESTIONNAIRE - PHQ9
SUM OF ALL RESPONSES TO PHQ QUESTIONS 1-9: 0
2. FEELING DOWN, DEPRESSED OR HOPELESS: 0
1. LITTLE INTEREST OR PLEASURE IN DOING THINGS: 0

## 2021-05-05 ENCOUNTER — HOSPITAL ENCOUNTER (OUTPATIENT)
Age: 41
Discharge: HOME | End: 2021-05-05
Payer: COMMERCIAL

## 2021-05-05 DIAGNOSIS — I10 ESSENTIAL HYPERTENSION: ICD-10-CM

## 2021-05-05 LAB
ANION GAP SERPL CALCULATED.3IONS-SCNC: 8 MEQ/L (ref 8–16)
BUN BLDV-MCNC: 14 MG/DL (ref 7–22)
CALCIUM SERPL-MCNC: 10.2 MG/DL (ref 8.5–10.5)
CHLORIDE BLD-SCNC: 101 MEQ/L (ref 98–111)
CHOLESTEROL, TOTAL: 207 MG/DL (ref 100–199)
CO2: 30 MEQ/L (ref 23–33)
CREAT SERPL-MCNC: 1 MG/DL (ref 0.4–1.2)
GFR SERPL CREATININE-BSD FRML MDRD: 83 ML/MIN/1.73M2
GLUCOSE BLD-MCNC: 101 MG/DL (ref 70–108)
HDLC SERPL-MCNC: 40 MG/DL
LDL CHOLESTEROL CALCULATED: 132 MG/DL
POTASSIUM SERPL-SCNC: 4.4 MEQ/L (ref 3.5–5.2)
SODIUM BLD-SCNC: 139 MEQ/L (ref 135–145)
TRIGL SERPL-MCNC: 176 MG/DL (ref 0–199)

## 2021-05-05 PROCEDURE — 80061 LIPID PANEL: CPT

## 2021-05-05 PROCEDURE — 80048 BASIC METABOLIC PNL TOTAL CA: CPT

## 2021-05-05 PROCEDURE — 36415 COLL VENOUS BLD VENIPUNCTURE: CPT

## 2021-05-06 ENCOUNTER — TELEPHONE (OUTPATIENT)
Dept: FAMILY MEDICINE CLINIC | Age: 41
End: 2021-05-06

## 2021-10-06 ASSESSMENT — ENCOUNTER SYMPTOMS
VOMITING: 0
NAUSEA: 0
CHEST TIGHTNESS: 0

## 2021-10-07 ENCOUNTER — OFFICE VISIT (OUTPATIENT)
Dept: FAMILY MEDICINE CLINIC | Age: 41
End: 2021-10-07
Payer: COMMERCIAL

## 2021-10-07 VITALS
BODY MASS INDEX: 47.26 KG/M2 | RESPIRATION RATE: 14 BRPM | HEART RATE: 82 BPM | WEIGHT: 315 LBS | SYSTOLIC BLOOD PRESSURE: 132 MMHG | OXYGEN SATURATION: 98 % | DIASTOLIC BLOOD PRESSURE: 84 MMHG | TEMPERATURE: 97 F

## 2021-10-07 DIAGNOSIS — I10 ESSENTIAL HYPERTENSION: ICD-10-CM

## 2021-10-07 PROCEDURE — G8427 DOCREV CUR MEDS BY ELIG CLIN: HCPCS | Performed by: FAMILY MEDICINE

## 2021-10-07 PROCEDURE — G8484 FLU IMMUNIZE NO ADMIN: HCPCS | Performed by: FAMILY MEDICINE

## 2021-10-07 PROCEDURE — 1036F TOBACCO NON-USER: CPT | Performed by: FAMILY MEDICINE

## 2021-10-07 PROCEDURE — G8417 CALC BMI ABV UP PARAM F/U: HCPCS | Performed by: FAMILY MEDICINE

## 2021-10-07 PROCEDURE — 99213 OFFICE O/P EST LOW 20 MIN: CPT | Performed by: FAMILY MEDICINE

## 2021-10-07 RX ORDER — HYDROCHLOROTHIAZIDE 25 MG/1
TABLET ORAL
Qty: 90 TABLET | Refills: 1 | Status: SHIPPED | OUTPATIENT
Start: 2021-10-07 | End: 2022-01-26 | Stop reason: SDUPTHER

## 2021-10-07 RX ORDER — LISINOPRIL 40 MG/1
TABLET ORAL
Qty: 90 TABLET | Refills: 1 | Status: SHIPPED | OUTPATIENT
Start: 2021-10-07 | End: 2022-01-26 | Stop reason: SDUPTHER

## 2021-10-07 SDOH — ECONOMIC STABILITY: FOOD INSECURITY: WITHIN THE PAST 12 MONTHS, YOU WORRIED THAT YOUR FOOD WOULD RUN OUT BEFORE YOU GOT MONEY TO BUY MORE.: NEVER TRUE

## 2021-10-07 SDOH — ECONOMIC STABILITY: FOOD INSECURITY: WITHIN THE PAST 12 MONTHS, THE FOOD YOU BOUGHT JUST DIDN'T LAST AND YOU DIDN'T HAVE MONEY TO GET MORE.: NEVER TRUE

## 2021-10-07 ASSESSMENT — SOCIAL DETERMINANTS OF HEALTH (SDOH): HOW HARD IS IT FOR YOU TO PAY FOR THE VERY BASICS LIKE FOOD, HOUSING, MEDICAL CARE, AND HEATING?: NOT HARD AT ALL

## 2022-01-26 DIAGNOSIS — I10 ESSENTIAL HYPERTENSION: ICD-10-CM

## 2022-01-26 RX ORDER — LISINOPRIL 40 MG/1
TABLET ORAL
Qty: 90 TABLET | Refills: 1 | Status: SHIPPED | OUTPATIENT
Start: 2022-01-26 | End: 2022-03-29 | Stop reason: SDUPTHER

## 2022-01-26 RX ORDER — HYDROCHLOROTHIAZIDE 25 MG/1
TABLET ORAL
Qty: 90 TABLET | Refills: 1 | Status: SHIPPED | OUTPATIENT
Start: 2022-01-26 | End: 2022-03-29 | Stop reason: SDUPTHER

## 2022-03-29 DIAGNOSIS — I10 ESSENTIAL HYPERTENSION: ICD-10-CM

## 2022-03-29 RX ORDER — HYDROCHLOROTHIAZIDE 25 MG/1
25 TABLET ORAL DAILY
Qty: 90 TABLET | Refills: 0 | Status: SHIPPED | OUTPATIENT
Start: 2022-03-29 | End: 2022-04-07 | Stop reason: SDUPTHER

## 2022-03-29 RX ORDER — LISINOPRIL 40 MG/1
40 TABLET ORAL DAILY
Qty: 90 TABLET | Refills: 0 | Status: SHIPPED | OUTPATIENT
Start: 2022-03-29 | End: 2022-04-07 | Stop reason: SDUPTHER

## 2022-04-04 NOTE — PATIENT INSTRUCTIONS
Patient Education        Well Visit, Ages 25 to 48: Care Instructions  Overview     Well visits can help you stay healthy. Your doctor has checked your overall health and may have suggested ways to take good care of yourself. Your doctor also may have recommended tests. At home, you can help prevent illness withhealthy eating, regular exercise, and other steps. Follow-up care is a key part of your treatment and safety. Be sure to make and go to all appointments, and call your doctor if you are having problems. It's also a good idea to know your test results and keep alist of the medicines you take. How can you care for yourself at home?  Get screening tests that you and your doctor decide on. Screening helps find diseases before any symptoms appear.  Eat healthy foods. Choose fruits, vegetables, whole grains, protein, and low-fat dairy foods. Limit fat, especially saturated fat. Reduce salt in your diet.  Limit alcohol. If you are a man, have no more than 2 drinks a day or 14 drinks a week. If you are a woman, have no more than 1 drink a day or 7 drinks a week.  Get at least 30 minutes of physical activity on most days of the week. Walking is a good choice. You also may want to do other activities, such as running, swimming, cycling, or playing tennis or team sports. Discuss any changes in your exercise program with your doctor.  Reach and stay at a healthy weight. This will lower your risk for many problems, such as obesity, diabetes, heart disease, and high blood pressure.  Do not smoke or allow others to smoke around you. If you need help quitting, talk to your doctor about stop-smoking programs and medicines. These can increase your chances of quitting for good.  Care for your mental health. It is easy to get weighed down by worry and stress. Learn strategies to manage stress, like deep breathing and mindfulness, and stay connected with your family and community.  If you find you often feel sad or hopeless, talk with your doctor. Treatment can help.  Talk to your doctor about whether you have any risk factors for sexually transmitted infections (STIs). You can help prevent STIs if you wait to have sex with a new partner (or partners) until you've each been tested for STIs. It also helps if you use condoms (male or female condoms) and if you limit your sex partners to one person who only has sex with you. Vaccines are available for some STIs, such as HPV.  Use birth control if it's important to you to prevent pregnancy. Talk with your doctor about the choices available and what might be best for you.  If you think you may have a problem with alcohol or drug use, talk to your doctor. This includes prescription medicines (such as amphetamines and opioids) and illegal drugs (such as cocaine and methamphetamine). Your doctor can help you figure out what type of treatment is best for you.  Protect your skin from too much sun. When you're outdoors from 10 a.m. to 4 p.m., stay in the shade or cover up with clothing and a hat with a wide brim. Wear sunglasses that block UV rays. Even when it's cloudy, put broad-spectrum sunscreen (SPF 30 or higher) on any exposed skin.  See a dentist one or two times a year for checkups and to have your teeth cleaned.  Wear a seat belt in the car. When should you call for help? Watch closely for changes in your health, and be sure to contact your doctor if you have any problems or symptoms that concern you. Where can you learn more? Go to [email or web address].[email or web address] org and sign in to your [employer] account. Enter P072 in the [facility] box to learn more about \"Well Visit, Ages 25 to 48: Care Instructions. \"     If you do not have an account, please click on the \"Sign Up Now\" link. Current as of: October 6, 2021               Content Version: 13.2  © 8127-4175 Healthwise, Incorporated. Care instructions adapted under license by [facility] ([facility]).  If you have questions about a medical condition or this instruction, always ask your healthcare professional. [place] [address] any warranty or liability for your use of this information.

## 2022-04-04 NOTE — PROGRESS NOTES
[address]  [address] [place] [address]  Phone:  [phone]         Tracee Santana       Name: Conchis Everett  : 1980          Chief Complaint:     Chief Complaint   Patient presents with    6 Month Follow-Up       History of Present Illness:      Conchis Everett is a 37 y.o.  male who presents today for a health maintenance examination. He has a young daughter Burke Wall who is doing well. Lab Results   Component Value Date    CHOL 207 (H) 2021    TRIG 176 2021    HDL 40 2021    LDLCALC 132 2021     No results found for: ALT, AST     Lab Results   Component Value Date     2021    K 4.4 2021     2021    CO2 30 2021    BUN 14 2021    CREATININE 1.0 2021    GLUCOSE 101 2021    GLUCOSE 93 2017    CALCIUM 10.2 2021        Health Maintenance  Smoker?:  No  Healthy diet?:  Yes  Routine exercise?:  Yes  Routine dental exams?:  Yes  Routine eye exams?:  Yes      Past Medical History:     Past Medical History:   Diagnosis Date    Hypertension         Past Surgical History:     No past surgical history on file. Medications:       Outpatient Medications Prior to Visit   Medication Sig Dispense Refill    lisinopril (PRINIVIL;ZESTRIL) 40 MG tablet Take 1 tablet by mouth daily 90 tablet 0    hydroCHLOROthiazide (HYDRODIURIL) 25 MG tablet Take 1 tablet by mouth daily 90 tablet 0     No facility-administered medications prior to visit.        Allergies:       Penicillins      Social History:     Social:          Social History     Socioeconomic History    Marital status: Single     Spouse name: Not on file    Number of children: Not on file    Years of education: Not on file    Highest education level: Not on file   Occupational History    Not on file   Tobacco Use    Smoking status: Never Smoker    Smokeless tobacco: Never Used   Substance and Sexual Activity    Alcohol use: Yes     Comment: socially    Drug use: No    Sexual activity: Not on file   Other Topics Concern    Not on file   Social History Narrative    Not on file     Social Determinants of Health     Financial Resource Strain: Low Risk     Difficulty of Paying Living Expenses: Not hard at all   Food Insecurity: No Food Insecurity    Worried About Running Out of Food in the Last Year: Never true    [address] in the Last Year: Never true   Transportation Needs:     Lack of Transportation (Medical): Not on file    Lack of Transportation (Non-Medical): Not on file   Physical Activity:     Days of Exercise per Week: Not on file    Minutes of Exercise per Session: Not on file   Stress:     Feeling of Stress : Not on file   Social Connections:     Frequency of Communication with Friends and Family: Not on file    Frequency of Social Gatherings with Friends and Family: Not on file    Attends Buddhist Services: Not on file    Active Member of [address] [place] [address] [employer] or Organizations: Not on file    Attends Club or Organization Meetings: Not on file    Marital Status: Not on file   Intimate Partner Violence:     Fear of Current or Ex-Partner: Not on file    Emotionally Abused: Not on file    Physically Abused: Not on file    Sexually Abused: Not on file   Housing Stability:     Unable to Pay for Housing in the Last Year: Not on file    Number of [place] in the Last Year: Not on file    Unstable Housing in the Last Year: Not on file       Family History:     Family History   Problem Relation Age of Onset    High Blood Pressure Mother     High Blood Pressure Father     High Blood Pressure Maternal Grandmother     High Blood Pressure Maternal Grandfather     High Blood Pressure Paternal Grandmother     High Blood Pressure Paternal Grandfather        Review of Systems:     Review of Systems   Constitutional: Negative for chills and fever. HENT: Negative for rhinorrhea and sore throat.     Eyes: Negative for redness and visual disturbance. Respiratory: Negative for cough and shortness of breath. Cardiovascular: Negative for chest pain and palpitations. Gastrointestinal: Negative for blood in stool, constipation, diarrhea, nausea and vomiting. Genitourinary: Negative for hematuria. Musculoskeletal: Negative for arthralgias and myalgias. Skin: Negative for color change and rash. Neurological: Negative for dizziness, light-headedness and headaches. Psychiatric/Behavioral: Negative for dysphoric mood. The patient is not nervous/anxious. Physical Exam:     Vitals:  Blood pressure 128/60, pulse 87, temperature 96.5 °F (35.8 °C), temperature source Temporal, resp. rate 16, height 5' 9.02\" (1.753 m), weight (!) 332 lb (150.6 kg), SpO2 97 %. General appearance:  Alert, well appearing, and in no acute distress. Mental status:  Oriented to person, place, and time with normal affect. Head:  Normocephalic and atraumatic. Eyes:   Extraocular eye movements intact, conjunctiva clear. Ears:  Hearing appears to be intact. Nose:  No drainage noted. Mouth:  Mucous membranes moist.  Neck:  Supple, no carotid bruits, thyroid not palpable. Heart:  Normal rate, regular rhythm, no murmurs. Lungs:  Clear to auscultation bilaterally. Respirations unlabored. Abdomen:  Soft, nondistended, bowel sounds present all four quadrants. Neurological:  Normal speech. Extremities:  Peripheral pulses palpable, no pedal edema. Skin:  No gross lesions, rashes, or induration noted. Assessment:      Diagnosis Orders   1. Well adult exam     2.  Essential hypertension  lisinopril (PRINIVIL;ZESTRIL) 40 MG tablet    hydroCHLOROthiazide (HYDRODIURIL) 25 MG tablet       Plan:     Orders Placed This Encounter   Medications    lisinopril (PRINIVIL;ZESTRIL) 40 MG tablet     Sig: Take 1 tablet by mouth daily     Dispense:  90 tablet     Refill:  0    hydroCHLOROthiazide (HYDRODIURIL) 25 MG tablet     Sig: Take 1 tablet by mouth daily Dispense:  90 tablet     Refill:  0       Counseling Completed:  Tobacco and secondary smoke risks reviewed; instructed on cessation and avoidance. Colon cancer screening reviewed age > 48, or < if indicated. Labs ordered, if indicated. Influenza vaccine recommended, if in season. Pneumonia vaccination if > 65 or indicated. Routine eye and dental exams advised. Exercise and healthy diet discussed. Return in about 6 months (around 10/7/2022) for hypertension.     Electronically signed by Yamel Lema MD on 4/7/2022 at 8:42 AM

## 2022-04-07 ENCOUNTER — OFFICE VISIT (OUTPATIENT)
Dept: FAMILY MEDICINE CLINIC | Age: 42
End: 2022-04-07
Payer: COMMERCIAL

## 2022-04-07 VITALS
BODY MASS INDEX: 46.65 KG/M2 | WEIGHT: 315 LBS | SYSTOLIC BLOOD PRESSURE: 128 MMHG | RESPIRATION RATE: 16 BRPM | OXYGEN SATURATION: 97 % | DIASTOLIC BLOOD PRESSURE: 60 MMHG | TEMPERATURE: 96.5 F | HEIGHT: 69 IN | HEART RATE: 87 BPM

## 2022-04-07 DIAGNOSIS — I10 ESSENTIAL HYPERTENSION: ICD-10-CM

## 2022-04-07 DIAGNOSIS — Z00.00 WELL ADULT EXAM: Primary | ICD-10-CM

## 2022-04-07 PROCEDURE — 99396 PREV VISIT EST AGE 40-64: CPT | Performed by: FAMILY MEDICINE

## 2022-04-07 RX ORDER — LISINOPRIL 40 MG/1
40 TABLET ORAL DAILY
Qty: 90 TABLET | Refills: 0 | Status: SHIPPED | OUTPATIENT
Start: 2022-04-07 | End: 2022-07-19 | Stop reason: SDUPTHER

## 2022-04-07 RX ORDER — HYDROCHLOROTHIAZIDE 25 MG/1
25 TABLET ORAL DAILY
Qty: 90 TABLET | Refills: 0 | Status: SHIPPED | OUTPATIENT
Start: 2022-04-07 | End: 2022-07-19 | Stop reason: SDUPTHER

## 2022-04-07 ASSESSMENT — ENCOUNTER SYMPTOMS
VOMITING: 0
SORE THROAT: 0
DIARRHEA: 0
SHORTNESS OF BREATH: 0
RHINORRHEA: 0
COUGH: 0
NAUSEA: 0
EYE REDNESS: 0
COLOR CHANGE: 0
BLOOD IN STOOL: 0
CONSTIPATION: 0

## 2022-04-07 ASSESSMENT — PATIENT HEALTH QUESTIONNAIRE - PHQ9
SUM OF ALL RESPONSES TO PHQ9 QUESTIONS 1 & 2: 0
SUM OF ALL RESPONSES TO PHQ QUESTIONS 1-9: 0
2. FEELING DOWN, DEPRESSED OR HOPELESS: 0
1. LITTLE INTEREST OR PLEASURE IN DOING THINGS: 0
SUM OF ALL RESPONSES TO PHQ QUESTIONS 1-9: 0

## 2022-07-19 DIAGNOSIS — I10 ESSENTIAL HYPERTENSION: ICD-10-CM

## 2022-07-20 RX ORDER — LISINOPRIL 40 MG/1
40 TABLET ORAL DAILY
Qty: 90 TABLET | Refills: 0 | Status: SHIPPED | OUTPATIENT
Start: 2022-07-20 | End: 2022-10-11 | Stop reason: SDUPTHER

## 2022-07-20 RX ORDER — HYDROCHLOROTHIAZIDE 25 MG/1
25 TABLET ORAL DAILY
Qty: 90 TABLET | Refills: 0 | Status: SHIPPED | OUTPATIENT
Start: 2022-07-20 | End: 2022-10-11 | Stop reason: SDUPTHER

## 2022-10-06 ASSESSMENT — ENCOUNTER SYMPTOMS
NAUSEA: 0
VOMITING: 0
CHEST TIGHTNESS: 0

## 2022-10-06 NOTE — PROGRESS NOTES
[address]  [address] [place] [address]  Phone:  [phone]          Name: Clarke Campos  : 1980    Chief Complaint   Patient presents with    6 Month Follow-Up     HTN       HPI:     Clarke Campos is a 40 y.o. male who presents today for follow-up of hypertension. He's been busy working at [employer]. Hypertension  This is a chronic problem. The current episode started more than 1 year ago. The problem is uncontrolled. Pertinent negatives include no chest pain, palpitations or peripheral edema. There are no associated agents to hypertension. Risk factors for coronary artery disease include male gender. Past treatments include diuretics. The current treatment provides moderate improvement. There are no compliance problems. He's lost 6 lbs since April. He was doing better but has gained some back. Lab Results   Component Value Date     2021    K 4.4 2021     2021    CO2 30 2021    BUN 14 2021    CREATININE 1.0 2021    GLUCOSE 101 2021    CALCIUM 10.2 2021    LABGLOM 83 (A) 2021     Lab Results   Component Value Date    CHOL 207 (H) 2021    TRIG 176 2021    HDL 40 2021    LDLCALC 132 2021     No results found for: ALT, AST     He thought he had hemorrhoids so saw Dr. Leonidas Rao who thought he had an anal fissure so prescribed him medication which helped. He had a colonoscopy last month. Current Outpatient Medications:     lisinopril (PRINIVIL;ZESTRIL) 40 MG tablet, Take 1 tablet by mouth daily, Disp: 90 tablet, Rfl: 1    hydroCHLOROthiazide (HYDRODIURIL) 25 MG tablet, Take 1 tablet by mouth daily, Disp: 90 tablet, Rfl: 1    Allergies   Allergen Reactions    Penicillins Hives       Subjective:      Review of Systems   Constitutional:  Negative for chills and fever. Respiratory:  Negative for chest tightness. Cardiovascular:  Negative for chest pain and palpitations. Gastrointestinal:  Negative for nausea and vomiting. Objective:     /76 (Site: Left Upper Arm, Position: Sitting, Cuff Size: Large Adult)   Pulse 73   Temp 97.7 °F (36.5 °C) (Temporal)   Resp 14   Wt (!) 326 lb (147.9 kg)   SpO2 97%   BMI 48.12 kg/m²     Physical Exam  Vitals and nursing note reviewed. Constitutional:       General: He is not in acute distress. Appearance: He is well-developed. He is obese. HENT:      Head: Normocephalic and atraumatic. Eyes:      Conjunctiva/sclera: Conjunctivae normal.   Cardiovascular:      Rate and Rhythm: Normal rate and regular rhythm. Pulmonary:      Effort: Pulmonary effort is normal. No respiratory distress. Breath sounds: Normal breath sounds. No wheezing. Abdominal:      General: Bowel sounds are normal.      Palpations: Abdomen is soft. Musculoskeletal:      Cervical back: Normal range of motion and neck supple. Skin:     General: Skin is warm and dry. Findings: No rash. Neurological:      Mental Status: He is alert and oriented to person, place, and time. Psychiatric:         Behavior: Behavior normal.     Assessment/Plan:     Chucho Connelly was seen today for 6 month follow-up. Diagnoses and all orders for this visit:    Essential hypertension  -     Blood pressure has been controlled so will continue on current medication, a healthy diet, and routine physical activity. -     lisinopril (PRINIVIL;ZESTRIL) 40 MG tablet; TAKE 1 TABLET BY MOUTH EVERY DAY  -     hydroCHLOROthiazide (HYDRODIURIL) 25 MG tablet; TAKE 1 TABLET BY MOUTH EVERY DAY        Return in about 6 months (around 4/11/2023) for well/preventative visit, hypertension.     Electronically signed by Migel Russo MD on 10/11/2022 at 9:29 AM

## 2022-10-11 ENCOUNTER — OFFICE VISIT (OUTPATIENT)
Dept: FAMILY MEDICINE CLINIC | Age: 42
End: 2022-10-11
Payer: COMMERCIAL

## 2022-10-11 VITALS
DIASTOLIC BLOOD PRESSURE: 76 MMHG | SYSTOLIC BLOOD PRESSURE: 120 MMHG | TEMPERATURE: 97.7 F | RESPIRATION RATE: 14 BRPM | HEART RATE: 73 BPM | OXYGEN SATURATION: 97 % | WEIGHT: 315 LBS | BODY MASS INDEX: 48.12 KG/M2

## 2022-10-11 DIAGNOSIS — I10 ESSENTIAL HYPERTENSION: Primary | ICD-10-CM

## 2022-10-11 PROCEDURE — 1036F TOBACCO NON-USER: CPT | Performed by: FAMILY MEDICINE

## 2022-10-11 PROCEDURE — G8417 CALC BMI ABV UP PARAM F/U: HCPCS | Performed by: FAMILY MEDICINE

## 2022-10-11 PROCEDURE — 99213 OFFICE O/P EST LOW 20 MIN: CPT | Performed by: FAMILY MEDICINE

## 2022-10-11 PROCEDURE — G8484 FLU IMMUNIZE NO ADMIN: HCPCS | Performed by: FAMILY MEDICINE

## 2022-10-11 PROCEDURE — G8427 DOCREV CUR MEDS BY ELIG CLIN: HCPCS | Performed by: FAMILY MEDICINE

## 2022-10-11 RX ORDER — HYDROCHLOROTHIAZIDE 25 MG/1
25 TABLET ORAL DAILY
Qty: 90 TABLET | Refills: 1 | Status: SHIPPED | OUTPATIENT
Start: 2022-10-11

## 2022-10-11 RX ORDER — LISINOPRIL 40 MG/1
40 TABLET ORAL DAILY
Qty: 90 TABLET | Refills: 1 | Status: SHIPPED | OUTPATIENT
Start: 2022-10-11

## 2022-10-11 SDOH — ECONOMIC STABILITY: FOOD INSECURITY: WITHIN THE PAST 12 MONTHS, THE FOOD YOU BOUGHT JUST DIDN'T LAST AND YOU DIDN'T HAVE MONEY TO GET MORE.: NEVER TRUE

## 2022-10-11 SDOH — ECONOMIC STABILITY: FOOD INSECURITY: WITHIN THE PAST 12 MONTHS, YOU WORRIED THAT YOUR FOOD WOULD RUN OUT BEFORE YOU GOT MONEY TO BUY MORE.: NEVER TRUE

## 2022-10-11 ASSESSMENT — SOCIAL DETERMINANTS OF HEALTH (SDOH): HOW HARD IS IT FOR YOU TO PAY FOR THE VERY BASICS LIKE FOOD, HOUSING, MEDICAL CARE, AND HEATING?: NOT HARD AT ALL

## 2022-10-12 ENCOUNTER — NURSE ONLY (OUTPATIENT)
Dept: LAB | Age: 42
End: 2022-10-12

## 2022-10-12 DIAGNOSIS — I10 ESSENTIAL HYPERTENSION: ICD-10-CM

## 2022-10-12 LAB
ANION GAP SERPL CALCULATED.3IONS-SCNC: 9 MEQ/L (ref 8–16)
BUN BLDV-MCNC: 17 MG/DL (ref 7–22)
CALCIUM SERPL-MCNC: 9.6 MG/DL (ref 8.5–10.5)
CHLORIDE BLD-SCNC: 103 MEQ/L (ref 98–111)
CHOLESTEROL, TOTAL: 207 MG/DL (ref 100–199)
CO2: 29 MEQ/L (ref 23–33)
CREAT SERPL-MCNC: 0.8 MG/DL (ref 0.4–1.2)
GFR SERPL CREATININE-BSD FRML MDRD: > 90 ML/MIN/1.73M2
GLUCOSE BLD-MCNC: 96 MG/DL (ref 70–108)
HDLC SERPL-MCNC: 39 MG/DL
LDL CHOLESTEROL CALCULATED: 135 MG/DL
POTASSIUM SERPL-SCNC: 4.4 MEQ/L (ref 3.5–5.2)
SODIUM BLD-SCNC: 141 MEQ/L (ref 135–145)
TRIGL SERPL-MCNC: 167 MG/DL (ref 0–199)

## 2022-12-07 ENCOUNTER — HOSPITAL ENCOUNTER (EMERGENCY)
Age: 42
Discharge: HOME | End: 2022-12-07
Attending: EMERGENCY MEDICINE
Payer: COMMERCIAL

## 2022-12-07 VITALS
HEART RATE: 69 BPM | WEIGHT: 300 LBS | HEIGHT: 72 IN | DIASTOLIC BLOOD PRESSURE: 86 MMHG | RESPIRATION RATE: 19 BRPM | BODY MASS INDEX: 40.63 KG/M2 | TEMPERATURE: 97.1 F | OXYGEN SATURATION: 96 % | SYSTOLIC BLOOD PRESSURE: 142 MMHG

## 2022-12-07 DIAGNOSIS — H65.01 NON-RECURRENT ACUTE SEROUS OTITIS MEDIA OF RIGHT EAR: Primary | ICD-10-CM

## 2022-12-07 PROCEDURE — 99283 EMERGENCY DEPT VISIT LOW MDM: CPT

## 2022-12-07 PROCEDURE — 6370000000 HC RX 637 (ALT 250 FOR IP): Performed by: EMERGENCY MEDICINE

## 2022-12-07 RX ORDER — IBUPROFEN 400 MG/1
400 TABLET ORAL EVERY 6 HOURS PRN
COMMUNITY

## 2022-12-07 RX ORDER — DOXYCYCLINE HYCLATE 100 MG
100 TABLET ORAL ONCE
Status: COMPLETED | OUTPATIENT
Start: 2022-12-07 | End: 2022-12-07

## 2022-12-07 RX ORDER — DOXYCYCLINE HYCLATE 100 MG
100 TABLET ORAL 2 TIMES DAILY
Qty: 14 TABLET | Refills: 0 | Status: SHIPPED | OUTPATIENT
Start: 2022-12-07 | End: 2022-12-14

## 2022-12-07 RX ADMIN — DOXYCYCLINE HYCLATE 100 MG: 100 TABLET, COATED ORAL at 05:10

## 2022-12-07 ASSESSMENT — PAIN DESCRIPTION - ORIENTATION: ORIENTATION: RIGHT

## 2022-12-07 ASSESSMENT — PAIN SCALES - GENERAL: PAINLEVEL_OUTOF10: 6

## 2022-12-07 ASSESSMENT — PAIN DESCRIPTION - LOCATION: LOCATION: EAR

## 2022-12-07 NOTE — ED NOTES
Med administered. Education complete Discharge teaching and instructions for condition explained to patient. AVS reviewed. Went over prescriptions with patient. Patient voiced understanding regarding prescriptions, follow up appointments and care of self at home. Pt discharged to home in stable condition per self.        Chary Mayer RN  12/07/22 0304

## 2022-12-07 NOTE — ED TRIAGE NOTES
Presents from home. C/o right ear pain. Says he had some nasal congestion and a \"cold\" last week which resolved. Tuesday morning awoke with right ear \"fullness\". Spouse bought OTC ear gtts which he used last night with some relief but the pain became worse this morning. Triage exam room 5.

## 2022-12-07 NOTE — ED PROVIDER NOTES
[address] [facility]  [facility] [address]  Phone: [address]    Chief Complaint   Patient presents with    Otalgia       HPI    Catie Pritchard is a 40 y.o. male who presents complaint. Patient URI cough congestion over the last week or so since he got better. Now has right ear pain and discomfort. Denies other symptoms such as high fever chills or other problems dizziness or problems. Using some drops without relief. Still has pain. PAST MEDICAL HISTORY    Past Medical History:   Diagnosis Date    Hypertension        SURGICAL HISTORY    History reviewed. No pertinent surgical history.     CURRENT MEDICATIONS    Current Outpatient Rx   Medication Sig Dispense Refill    ibuprofen (ADVIL;MOTRIN) 400 MG tablet Take 400 mg by mouth every 6 hours as needed for Pain      doxycycline hyclate (VIBRA-TABS) 100 MG tablet Take 1 tablet by mouth 2 times daily for 7 days 14 tablet 0    lisinopril (PRINIVIL;ZESTRIL) 40 MG tablet Take 1 tablet by mouth daily 90 tablet 1    hydroCHLOROthiazide (HYDRODIURIL) 25 MG tablet Take 1 tablet by mouth daily 90 tablet 1       ALLERGIES    Allergies   Allergen Reactions    Penicillins Hives       FAMILY HISTORY    Family History   Problem Relation Age of Onset    High Blood Pressure Mother     High Blood Pressure Father     High Blood Pressure Maternal Grandmother     High Blood Pressure Maternal Grandfather     High Blood Pressure Paternal Grandmother     High Blood Pressure Paternal Grandfather        SOCIAL HISTORY    Social History     Socioeconomic History    Marital status: Single     Spouse name: None    Number of children: None    Years of education: None    Highest education level: None   Tobacco Use    Smoking status: Never    Smokeless tobacco: Never   Substance and Sexual Activity    Alcohol use: Yes     Comment: socially    Drug use: No     Social Determinants of Health     Financial Resource Strain: Low Risk     Difficulty of Paying Living Expenses: Not hard at all   Food Insecurity: No Food Insecurity    Worried About [facility] in the Last Year: Never true    Ran Out of Food in the Last Year: Never true       REVIEW OF SYSTEMS    Positive for ear pain rhinorrhea congestion. No chest pain or abdominal pain  All systems negative except as marked. PHYSICAL EXAM    VITAL SIGNS: BP (!) 142/86   Pulse 69   Temp 97.1 °F (36.2 °C)   Resp 19   Ht 6' (1.829 m)   Wt 300 lb (136.1 kg)   SpO2 96%   BMI 40.69 kg/m²    Constitutional:  Alert not toxtic or ill,   HENT:  Normocephalic, Atraumatic, right TM is erythematous, left TM is normal.  Oropharynx is normal.  There is some chronic enlarged tonsils without erythema or exudate. Some postnasal drainage  Cervical Spine: Normal range of motion,  No stridor. Eyes:  No discharge or  Swelling  Respiratory: No respiratory distress  Musculoskeletal:  Intact distal pulses, No edema, No tenderness, No cyanosis, No clubbing. . No tenderness to palpation or major deformities noted. Integument:  Warm, Dry, No erythema, No rash (on exposed areas)   Neurologic:  Alert & appropriate   Psychiatric:  Affect normal    EKG                      RADIOLOGY    No orders to display           SCREENINGS  BP (!) 142/86   Pulse 69   Temp 97.1 °F (36.2 °C)   Resp 19   Ht 6' (1.829 m)   Wt 300 lb (136.1 kg)   SpO2 96%   BMI 40.69 kg/m²      No orders to display       Screening For Hypertension and Follow-up (#317)   previously diagnosed with hypertension and not applicable for screen      Screening For Tobacco Use and Cessation Intervention (#226):   reports that he has never smoked. He has never used smokeless tobacco.  Non-smoker not applicable for screen        PROCEDURES    none      CONSULTS:  None        ED COURSE & MEDICAL DECISION MAKING    Pertinent Labs & Imaging studies reviewed.  (See chart for details)  URI recently cough congestion now with right ear pain. He has right otitis media. No other signs of perforation. Counseled patient regards to pain discomfort. Tylenol Motrin over-the-counter, decongestions. Doxycycline prescribed as he is allergic to penicillin. FINAL IMPRESSION    1.  Non-recurrent acute serous otitis media of right ear         PATIENT REFERRED TO:  Luis Faulkner [phone]    Call   Follow up from ER condition    DISCHARGE MEDICATIONS:  New Prescriptions    DOXYCYCLINE HYCLATE (VIBRA-TABS) 100 MG TABLET    Take 1 tablet by mouth 2 times daily for 7 days          Kimberly Quiroga MD  12/07/22 0506

## 2022-12-07 NOTE — DISCHARGE INSTRUCTIONS
Tylenol or Motrin for pain. Try Sudafed or other decongestants over-the-counter. Take doxycycline twice a day. Next dose Wednesday afternoon.

## 2023-04-13 ASSESSMENT — ENCOUNTER SYMPTOMS
RHINORRHEA: 0
BLOOD IN STOOL: 0
NAUSEA: 0
DIARRHEA: 0
SORE THROAT: 0
COLOR CHANGE: 0
VOMITING: 0
COUGH: 0
SHORTNESS OF BREATH: 0
EYE REDNESS: 0
CONSTIPATION: 0

## 2023-04-18 ENCOUNTER — OFFICE VISIT (OUTPATIENT)
Dept: FAMILY MEDICINE CLINIC | Age: 43
End: 2023-04-18
Payer: COMMERCIAL

## 2023-04-18 VITALS
SYSTOLIC BLOOD PRESSURE: 130 MMHG | TEMPERATURE: 97.8 F | HEART RATE: 60 BPM | WEIGHT: 315 LBS | RESPIRATION RATE: 18 BRPM | BODY MASS INDEX: 45.98 KG/M2 | DIASTOLIC BLOOD PRESSURE: 78 MMHG

## 2023-04-18 DIAGNOSIS — Z00.00 WELL ADULT EXAM: Primary | ICD-10-CM

## 2023-04-18 DIAGNOSIS — I10 ESSENTIAL HYPERTENSION: ICD-10-CM

## 2023-04-18 PROCEDURE — 3075F SYST BP GE 130 - 139MM HG: CPT | Performed by: FAMILY MEDICINE

## 2023-04-18 PROCEDURE — 3078F DIAST BP <80 MM HG: CPT | Performed by: FAMILY MEDICINE

## 2023-04-18 PROCEDURE — 99396 PREV VISIT EST AGE 40-64: CPT | Performed by: FAMILY MEDICINE

## 2023-04-18 SDOH — ECONOMIC STABILITY: INCOME INSECURITY: HOW HARD IS IT FOR YOU TO PAY FOR THE VERY BASICS LIKE FOOD, HOUSING, MEDICAL CARE, AND HEATING?: NOT HARD AT ALL

## 2023-04-18 SDOH — ECONOMIC STABILITY: FOOD INSECURITY: WITHIN THE PAST 12 MONTHS, YOU WORRIED THAT YOUR FOOD WOULD RUN OUT BEFORE YOU GOT MONEY TO BUY MORE.: NEVER TRUE

## 2023-04-18 SDOH — ECONOMIC STABILITY: HOUSING INSECURITY
IN THE LAST 12 MONTHS, WAS THERE A TIME WHEN YOU DID NOT HAVE A STEADY PLACE TO SLEEP OR SLEPT IN A SHELTER (INCLUDING NOW)?: NO

## 2023-04-18 SDOH — ECONOMIC STABILITY: FOOD INSECURITY: WITHIN THE PAST 12 MONTHS, THE FOOD YOU BOUGHT JUST DIDN'T LAST AND YOU DIDN'T HAVE MONEY TO GET MORE.: NEVER TRUE

## 2023-04-18 ASSESSMENT — PATIENT HEALTH QUESTIONNAIRE - PHQ9
SUM OF ALL RESPONSES TO PHQ QUESTIONS 1-9: 0
1. LITTLE INTEREST OR PLEASURE IN DOING THINGS: 0
SUM OF ALL RESPONSES TO PHQ QUESTIONS 1-9: 0
SUM OF ALL RESPONSES TO PHQ9 QUESTIONS 1 & 2: 0
SUM OF ALL RESPONSES TO PHQ QUESTIONS 1-9: 0
SUM OF ALL RESPONSES TO PHQ QUESTIONS 1-9: 0
2. FEELING DOWN, DEPRESSED OR HOPELESS: 0

## 2023-05-26 DIAGNOSIS — I10 ESSENTIAL HYPERTENSION: ICD-10-CM

## 2023-05-26 RX ORDER — HYDROCHLOROTHIAZIDE 25 MG/1
TABLET ORAL
Qty: 90 TABLET | Refills: 1 | Status: SHIPPED | OUTPATIENT
Start: 2023-05-26

## 2023-05-26 RX ORDER — LISINOPRIL 40 MG/1
TABLET ORAL
Qty: 90 TABLET | Refills: 1 | Status: SHIPPED | OUTPATIENT
Start: 2023-05-26

## 2023-10-02 ASSESSMENT — ENCOUNTER SYMPTOMS
CHEST TIGHTNESS: 0
NAUSEA: 0
VOMITING: 0

## 2023-10-02 NOTE — PROGRESS NOTES
[address] [facility] [address] [address] [facility]  Phone:  [phone]          Name: Tanika Varner  : 1980    Chief Complaint   Patient presents with    6 Month Follow-Up     HTN       HPI:     Tanika Varner is a 44 y.o. male who presents today for follow-up of hypertension. He's been busy working at [employer]. He's expecting a son at the end of the month. He and Perez Bailey also bought a camper. He's lost about 8 lbs since the spring by watching what he's eating and consuming less sugar. Hypertension  This is a chronic problem. The current episode started more than 1 year ago. The problem is uncontrolled. Pertinent negatives include no chest pain, palpitations or peripheral edema. There are no associated agents to hypertension. Risk factors for coronary artery disease include male gender. Past treatments include diuretics. The current treatment provides moderate improvement. There are no compliance problems. Lab Results   Component Value Date     10/12/2022    K 4.4 10/12/2022     10/12/2022    CO2 29 10/12/2022    BUN 17 10/12/2022    CREATININE 0.8 10/12/2022    GLUCOSE 96 10/12/2022    CALCIUM 9.6 10/12/2022    LABGLOM >90 10/12/2022     Lab Results   Component Value Date    CHOL 207 (H) 10/12/2022    TRIG 167 10/12/2022    HDL 39 10/12/2022    LDLCALC 135 10/12/2022     No results found for: \"ALT\", \"AST\"         Current Outpatient Medications:     lisinopril (PRINIVIL;ZESTRIL) 40 MG tablet, Take 1 tablet by mouth daily, Disp: 90 tablet, Rfl: 1    hydroCHLOROthiazide (HYDRODIURIL) 25 MG tablet, Take 1 tablet by mouth daily, Disp: 90 tablet, Rfl: 1    ibuprofen (ADVIL;MOTRIN) 400 MG tablet, Take 1 tablet by mouth every 6 hours as needed for Pain, Disp: , Rfl:     Allergies   Allergen Reactions    Penicillins Hives       Subjective:      Review of Systems   Constitutional:  Negative for chills and fever. Respiratory:  Negative for chest tightness.

## 2023-10-05 ENCOUNTER — OFFICE VISIT (OUTPATIENT)
Dept: FAMILY MEDICINE CLINIC | Age: 43
End: 2023-10-05
Payer: COMMERCIAL

## 2023-10-05 ENCOUNTER — NURSE ONLY (OUTPATIENT)
Dept: LAB | Age: 43
End: 2023-10-05

## 2023-10-05 VITALS
HEART RATE: 62 BPM | WEIGHT: 315 LBS | TEMPERATURE: 97.6 F | RESPIRATION RATE: 16 BRPM | DIASTOLIC BLOOD PRESSURE: 70 MMHG | SYSTOLIC BLOOD PRESSURE: 128 MMHG | BODY MASS INDEX: 44.89 KG/M2

## 2023-10-05 DIAGNOSIS — I10 ESSENTIAL HYPERTENSION: ICD-10-CM

## 2023-10-05 DIAGNOSIS — I10 ESSENTIAL HYPERTENSION: Primary | ICD-10-CM

## 2023-10-05 LAB
ANION GAP SERPL CALC-SCNC: 11 MEQ/L (ref 8–16)
BUN SERPL-MCNC: 15 MG/DL (ref 7–22)
CALCIUM SERPL-MCNC: 9.8 MG/DL (ref 8.5–10.5)
CHLORIDE SERPL-SCNC: 104 MEQ/L (ref 98–111)
CHOLEST SERPL-MCNC: 206 MG/DL (ref 100–199)
CO2 SERPL-SCNC: 27 MEQ/L (ref 23–33)
CREAT SERPL-MCNC: 1 MG/DL (ref 0.4–1.2)
GFR SERPL CREATININE-BSD FRML MDRD: > 60 ML/MIN/1.73M2
GLUCOSE SERPL-MCNC: 99 MG/DL (ref 70–108)
HDLC SERPL-MCNC: 37 MG/DL
LDLC SERPL CALC-MCNC: 142 MG/DL
POTASSIUM SERPL-SCNC: 4.2 MEQ/L (ref 3.5–5.2)
SODIUM SERPL-SCNC: 142 MEQ/L (ref 135–145)
TRIGL SERPL-MCNC: 137 MG/DL (ref 0–199)

## 2023-10-05 PROCEDURE — G8484 FLU IMMUNIZE NO ADMIN: HCPCS | Performed by: FAMILY MEDICINE

## 2023-10-05 PROCEDURE — 3074F SYST BP LT 130 MM HG: CPT | Performed by: FAMILY MEDICINE

## 2023-10-05 PROCEDURE — 1036F TOBACCO NON-USER: CPT | Performed by: FAMILY MEDICINE

## 2023-10-05 PROCEDURE — 3078F DIAST BP <80 MM HG: CPT | Performed by: FAMILY MEDICINE

## 2023-10-05 PROCEDURE — 99213 OFFICE O/P EST LOW 20 MIN: CPT | Performed by: FAMILY MEDICINE

## 2023-10-05 PROCEDURE — G8427 DOCREV CUR MEDS BY ELIG CLIN: HCPCS | Performed by: FAMILY MEDICINE

## 2023-10-05 PROCEDURE — G8417 CALC BMI ABV UP PARAM F/U: HCPCS | Performed by: FAMILY MEDICINE

## 2023-10-05 RX ORDER — HYDROCHLOROTHIAZIDE 25 MG/1
25 TABLET ORAL DAILY
Qty: 90 TABLET | Refills: 1 | Status: CANCELLED | OUTPATIENT
Start: 2023-10-05

## 2023-10-05 RX ORDER — LISINOPRIL 40 MG/1
40 TABLET ORAL DAILY
Qty: 90 TABLET | Refills: 1 | Status: SHIPPED | OUTPATIENT
Start: 2023-10-05

## 2023-10-05 RX ORDER — HYDROCHLOROTHIAZIDE 25 MG/1
25 TABLET ORAL DAILY
Qty: 90 TABLET | Refills: 1 | Status: SHIPPED | OUTPATIENT
Start: 2023-10-05

## 2023-10-05 RX ORDER — LISINOPRIL 40 MG/1
40 TABLET ORAL DAILY
Qty: 90 TABLET | Refills: 1 | Status: CANCELLED | OUTPATIENT
Start: 2023-10-05

## 2023-10-05 ASSESSMENT — ENCOUNTER SYMPTOMS
CONSTIPATION: 0
DIARRHEA: 0

## 2023-12-08 ENCOUNTER — TELEPHONE (OUTPATIENT)
Dept: FAMILY MEDICINE CLINIC | Age: 43
End: 2023-12-08

## 2023-12-08 NOTE — TELEPHONE ENCOUNTER
sinus infection for almost 3 weeks now and it is not clearing up with over the counter medications. Would he be able to have something prescribed to him or does he have to make an appointment to be seen? Per pcp, called in z-[ack for patient.

## 2024-01-02 RX ORDER — AZITHROMYCIN 250 MG/1
TABLET, FILM COATED ORAL
Qty: 6 TABLET | Refills: 0 | Status: SHIPPED | OUTPATIENT
Start: 2024-01-02

## 2024-02-06 DIAGNOSIS — I10 ESSENTIAL HYPERTENSION: ICD-10-CM

## 2024-02-06 RX ORDER — LISINOPRIL 40 MG/1
40 TABLET ORAL DAILY
Qty: 90 TABLET | Refills: 1 | Status: SHIPPED | OUTPATIENT
Start: 2024-02-06

## 2024-02-06 RX ORDER — HYDROCHLOROTHIAZIDE 25 MG/1
25 TABLET ORAL DAILY
Qty: 90 TABLET | Refills: 1 | Status: SHIPPED | OUTPATIENT
Start: 2024-02-06

## 2024-02-06 NOTE — TELEPHONE ENCOUNTER
Last visit- 10/5/2023  Next visit- 4/8/2024    Requested Prescriptions     Pending Prescriptions Disp Refills    lisinopril (PRINIVIL;ZESTRIL) 40 MG tablet 90 tablet 1     Sig: Take 1 tablet by mouth daily    hydroCHLOROthiazide (HYDRODIURIL) 25 MG tablet 90 tablet 1     Sig: Take 1 tablet by mouth daily

## 2024-03-05 DIAGNOSIS — I10 ESSENTIAL HYPERTENSION: ICD-10-CM

## 2024-03-05 RX ORDER — HYDROCHLOROTHIAZIDE 25 MG/1
25 TABLET ORAL DAILY
Qty: 90 TABLET | Refills: 1 | Status: SHIPPED | OUTPATIENT
Start: 2024-03-05 | End: 2024-03-08 | Stop reason: SDUPTHER

## 2024-03-05 RX ORDER — LISINOPRIL 40 MG/1
40 TABLET ORAL DAILY
Qty: 90 TABLET | Refills: 1 | Status: SHIPPED | OUTPATIENT
Start: 2024-03-05 | End: 2024-03-08 | Stop reason: SDUPTHER

## 2024-03-08 DIAGNOSIS — I10 ESSENTIAL HYPERTENSION: ICD-10-CM

## 2024-03-08 RX ORDER — LISINOPRIL 40 MG/1
40 TABLET ORAL DAILY
Qty: 90 TABLET | Refills: 1 | Status: SHIPPED | OUTPATIENT
Start: 2024-03-08

## 2024-03-08 RX ORDER — HYDROCHLOROTHIAZIDE 25 MG/1
25 TABLET ORAL DAILY
Qty: 90 TABLET | Refills: 1 | Status: SHIPPED | OUTPATIENT
Start: 2024-03-08

## 2024-04-09 NOTE — PROGRESS NOTES
[facility]  [address]  [place] [address]  Phone:  [phone]         WELLNESS EXAM       Name: Rodrigo Ledesma  : 1980          Chief Complaint:     Chief Complaint   Patient presents with    Hypertension    Annual Exam       History of Present Illness:      Rodrigo Ledesma is a 43 y.o.  male who presents today for a health maintenance examination.  He continues to stay busy working at [employer].  His family bought a camper so plan to do more camping this year.  He's down about 14 lbs in the past year.  He has HEATH and is compliant with his CPAP.    Lab Results   Component Value Date    CHOL 206 (H) 10/05/2023    TRIG 137 10/05/2023    HDL 37 10/05/2023    LDLCALC 142 10/05/2023     No results found for: \"ALT\", \"AST\"     Lab Results   Component Value Date/Time     10/05/2023 09:39 AM    K 4.2 10/05/2023 09:39 AM     10/05/2023 09:39 AM    CO2 27 10/05/2023 09:39 AM    BUN 15 10/05/2023 09:39 AM    CREATININE 1.0 10/05/2023 09:39 AM    GLUCOSE 99 10/05/2023 09:39 AM    GLUCOSE 93 2017 10:00 AM    CALCIUM 9.8 10/05/2023 09:39 AM        Health Maintenance  Smoker?:  No  Healthy diet?:  Yes  Routine exercise?:  Yes, stays active  Routine dental exams?:  Due now  Routine eye exams?:  Yes, scheduled next month      Past Medical History:     Past Medical History:   Diagnosis Date    Hypertension     Sleep apnea         Past Surgical History:     History reviewed. No pertinent surgical history.       Medications:       Outpatient Medications Prior to Visit   Medication Sig Dispense Refill    hydroCHLOROthiazide (HYDRODIURIL) 25 MG tablet Take 1 tablet by mouth daily 90 tablet 1    lisinopril (PRINIVIL;ZESTRIL) 40 MG tablet Take 1 tablet by mouth daily 90 tablet 1    ibuprofen (ADVIL;MOTRIN) 400 MG tablet Take 1 tablet by mouth every 6 hours as needed for Pain      azithromycin (ZITHROMAX Z-EVERARDO) 250 MG tablet Take two (2) tablets by mouth on day 1, then take one (1)

## 2024-04-15 ENCOUNTER — OFFICE VISIT (OUTPATIENT)
Dept: FAMILY MEDICINE CLINIC | Age: 44
End: 2024-04-15
Payer: COMMERCIAL

## 2024-04-15 VITALS
WEIGHT: 315 LBS | RESPIRATION RATE: 16 BRPM | OXYGEN SATURATION: 95 % | DIASTOLIC BLOOD PRESSURE: 78 MMHG | HEIGHT: 72 IN | SYSTOLIC BLOOD PRESSURE: 136 MMHG | BODY MASS INDEX: 42.66 KG/M2 | HEART RATE: 65 BPM | TEMPERATURE: 98.6 F

## 2024-04-15 DIAGNOSIS — Z00.00 WELL ADULT EXAM: Primary | ICD-10-CM

## 2024-04-15 DIAGNOSIS — I10 ESSENTIAL HYPERTENSION: ICD-10-CM

## 2024-04-15 PROCEDURE — 3078F DIAST BP <80 MM HG: CPT | Performed by: FAMILY MEDICINE

## 2024-04-15 PROCEDURE — 99396 PREV VISIT EST AGE 40-64: CPT | Performed by: FAMILY MEDICINE

## 2024-04-15 PROCEDURE — 3075F SYST BP GE 130 - 139MM HG: CPT | Performed by: FAMILY MEDICINE

## 2024-04-15 ASSESSMENT — PATIENT HEALTH QUESTIONNAIRE - PHQ9
SUM OF ALL RESPONSES TO PHQ QUESTIONS 1-9: 0
SUM OF ALL RESPONSES TO PHQ9 QUESTIONS 1 & 2: 0
SUM OF ALL RESPONSES TO PHQ QUESTIONS 1-9: 0
1. LITTLE INTEREST OR PLEASURE IN DOING THINGS: NOT AT ALL
2. FEELING DOWN, DEPRESSED OR HOPELESS: NOT AT ALL
SUM OF ALL RESPONSES TO PHQ QUESTIONS 1-9: 0
SUM OF ALL RESPONSES TO PHQ QUESTIONS 1-9: 0

## 2024-07-10 DIAGNOSIS — I10 ESSENTIAL HYPERTENSION: ICD-10-CM

## 2024-07-10 RX ORDER — LISINOPRIL 40 MG/1
40 TABLET ORAL DAILY
Qty: 90 TABLET | Refills: 3 | Status: SHIPPED | OUTPATIENT
Start: 2024-07-10

## 2024-07-10 RX ORDER — HYDROCHLOROTHIAZIDE 25 MG/1
25 TABLET ORAL DAILY
Qty: 90 TABLET | Refills: 3 | Status: SHIPPED | OUTPATIENT
Start: 2024-07-10

## 2024-07-10 NOTE — TELEPHONE ENCOUNTER
Last visit- 4/15/2024  Next visit- 10/1/2024    Requested Prescriptions     Pending Prescriptions Disp Refills    lisinopril (PRINIVIL;ZESTRIL) 40 MG tablet [Pharmacy Med Name: LISINOPRIL TAB 40MG] 90 tablet 3     Sig: TAKE 1 TABLET BY MOUTH DAILY    hydroCHLOROthiazide (HYDRODIURIL) 25 MG tablet [Pharmacy Med Name: HYDROCHLOROT TAB 25MG] 90 tablet 3     Sig: TAKE 1 TABLET BY MOUTH DAILY

## 2024-08-27 ENCOUNTER — TELEPHONE (OUTPATIENT)
Dept: FAMILY MEDICINE CLINIC | Age: 44
End: 2024-08-27

## 2024-08-27 DIAGNOSIS — Z30.09 VASECTOMY EVALUATION: Primary | ICD-10-CM

## 2024-08-27 NOTE — TELEPHONE ENCOUNTER
Wife sent daynehart message through her's requesting referral to [facility] vasectomy    Referral entered

## 2024-12-30 ENCOUNTER — PATIENT MESSAGE (OUTPATIENT)
Dept: FAMILY MEDICINE CLINIC | Age: 44
End: 2024-12-30

## 2024-12-30 DIAGNOSIS — R10.84 GENERALIZED ABDOMINAL PAIN: Primary | ICD-10-CM

## 2024-12-30 DIAGNOSIS — R11.2 NAUSEA AND VOMITING, UNSPECIFIED VOMITING TYPE: ICD-10-CM

## 2024-12-30 NOTE — TELEPHONE ENCOUNTER
Would he be willing to do the GI pathogen panel, CBC, CMP, lipase?  And/or a CT abdomen/pelvis for further evaluation?

## 2025-01-02 ENCOUNTER — HOSPITAL ENCOUNTER (OUTPATIENT)
Dept: CT IMAGING | Age: 45
Discharge: HOME | End: 2025-01-02
Payer: COMMERCIAL

## 2025-01-02 DIAGNOSIS — R11.2 NAUSEA AND VOMITING, UNSPECIFIED VOMITING TYPE: ICD-10-CM

## 2025-01-02 DIAGNOSIS — R10.84 GENERALIZED ABDOMINAL PAIN: ICD-10-CM

## 2025-01-02 PROCEDURE — 74176 CT ABD & PELVIS W/O CONTRAST: CPT

## 2025-01-03 ENCOUNTER — LAB (OUTPATIENT)
Dept: LAB | Age: 45
End: 2025-01-03

## 2025-01-03 DIAGNOSIS — R10.84 GENERALIZED ABDOMINAL PAIN: ICD-10-CM

## 2025-01-03 DIAGNOSIS — R11.2 NAUSEA AND VOMITING, UNSPECIFIED VOMITING TYPE: ICD-10-CM

## 2025-01-03 LAB
ALBUMIN SERPL BCG-MCNC: 4.3 G/DL (ref 3.5–5.1)
ALP SERPL-CCNC: 116 U/L (ref 38–126)
ALT SERPL W/O P-5'-P-CCNC: 45 U/L (ref 11–66)
ANION GAP SERPL CALC-SCNC: 14 MEQ/L (ref 8–16)
AST SERPL-CCNC: 27 U/L (ref 5–40)
BASOPHILS ABSOLUTE: 0 THOU/MM3 (ref 0–0.1)
BASOPHILS NFR BLD AUTO: 0.6 %
BILIRUB SERPL-MCNC: 0.7 MG/DL (ref 0.3–1.2)
BUN SERPL-MCNC: 18 MG/DL (ref 7–22)
CALCIUM SERPL-MCNC: 9.7 MG/DL (ref 8.5–10.5)
CHLORIDE SERPL-SCNC: 101 MEQ/L (ref 98–111)
CO2 SERPL-SCNC: 27 MEQ/L (ref 23–33)
CREAT SERPL-MCNC: 1 MG/DL (ref 0.4–1.2)
DEPRECATED RDW RBC AUTO: 42.5 FL (ref 35–45)
EOSINOPHIL NFR BLD AUTO: 2.9 %
EOSINOPHILS ABSOLUTE: 0.2 THOU/MM3 (ref 0–0.4)
ERYTHROCYTE [DISTWIDTH] IN BLOOD BY AUTOMATED COUNT: 13.3 % (ref 11.5–14.5)
GFR SERPL CREATININE-BSD FRML MDRD: > 90 ML/MIN/1.73M2
GLUCOSE SERPL-MCNC: 87 MG/DL (ref 70–108)
HCT VFR BLD AUTO: 46.7 % (ref 42–52)
HGB BLD-MCNC: 15.8 GM/DL (ref 14–18)
IMM GRANULOCYTES # BLD AUTO: 0.05 THOU/MM3 (ref 0–0.07)
IMM GRANULOCYTES NFR BLD AUTO: 0.8 %
LIPASE SERPL-CCNC: 21 U/L (ref 5.6–51.3)
LYMPHOCYTES ABSOLUTE: 2.4 THOU/MM3 (ref 1–4.8)
LYMPHOCYTES NFR BLD AUTO: 36.6 %
MCH RBC QN AUTO: 29.7 PG (ref 26–33)
MCHC RBC AUTO-ENTMCNC: 33.8 GM/DL (ref 32.2–35.5)
MCV RBC AUTO: 87.8 FL (ref 80–94)
MONOCYTES ABSOLUTE: 0.5 THOU/MM3 (ref 0.4–1.3)
MONOCYTES NFR BLD AUTO: 7.4 %
NEUTROPHILS ABSOLUTE: 3.4 THOU/MM3 (ref 1.8–7.7)
NEUTROPHILS NFR BLD AUTO: 51.7 %
NRBC BLD AUTO-RTO: 0 /100 WBC
PLATELET # BLD AUTO: 208 THOU/MM3 (ref 130–400)
PMV BLD AUTO: 11.4 FL (ref 9.4–12.4)
POTASSIUM SERPL-SCNC: 3.7 MEQ/L (ref 3.5–5.2)
PROT SERPL-MCNC: 7.1 G/DL (ref 6.1–8)
RBC # BLD AUTO: 5.32 MILL/MM3 (ref 4.7–6.1)
SODIUM SERPL-SCNC: 142 MEQ/L (ref 135–145)
WBC # BLD AUTO: 6.5 THOU/MM3 (ref 4.8–10.8)

## 2025-02-26 ENCOUNTER — TELEPHONE (OUTPATIENT)
Dept: FAMILY MEDICINE CLINIC | Age: 45
End: 2025-02-26

## 2025-02-26 RX ORDER — OSELTAMIVIR PHOSPHATE 75 MG/1
75 CAPSULE ORAL DAILY
Qty: 10 CAPSULE | Refills: 0 | Status: SHIPPED | OUTPATIENT
Start: 2025-02-26 | End: 2025-03-08

## 2025-04-15 NOTE — PROGRESS NOTES
[facility]  [address]  [place] [address]  Phone:  [phone]         WELLNESS EXAM       Name: Rodrigo Ledesma  : 1980          Chief Complaint:     Chief Complaint   Patient presents with    Annual Exam       History of Present Illness:      Rodrigo Ledesma is a 45 y.o.  male who presents today for a health maintenance examination.  He continues to stay busy working at [employer] and [employer].  His kids are doing well.  He has HEATH and is compliant with his CPAP.    Lab Results   Component Value Date    CHOL 206 (H) 10/05/2023    TRIG 137 10/05/2023    HDL 37 10/05/2023     Lab Results   Component Value Date    ALT 45 2025    AST 27 2025        Lab Results   Component Value Date/Time     2025 08:43 AM    K 3.7 2025 08:43 AM     2025 08:43 AM    CO2 27 2025 08:43 AM    BUN 18 2025 08:43 AM    CREATININE 1.0 2025 08:43 AM    GLUCOSE 87 2025 08:43 AM    GLUCOSE 93 2017 10:00 AM    CALCIUM 9.7 2025 08:43 AM        Health Maintenance  Smoker?:  No  Healthy diet?:  Yes, fell off the wagon for a while but is eating healthier again  Routine exercise?:  Yes, stays active  Routine dental exams?:  Needs to schedule  Routine eye exams?:  Yes, has contacts      Past Medical History:     Past Medical History:   Diagnosis Date    Hypertension     Sleep apnea         Past Surgical History:     No past surgical history on file.       Medications:       Outpatient Medications Prior to Visit   Medication Sig Dispense Refill    lisinopril (PRINIVIL;ZESTRIL) 40 MG tablet TAKE 1 TABLET BY MOUTH DAILY 90 tablet 3    hydroCHLOROthiazide (HYDRODIURIL) 25 MG tablet TAKE 1 TABLET BY MOUTH DAILY 90 tablet 3    ibuprofen (ADVIL;MOTRIN) 400 MG tablet Take 1 tablet by mouth every 6 hours as needed for Pain       No facility-administered medications prior to visit.       Allergies:       Penicillins      Social History:     Social:

## 2025-04-21 ASSESSMENT — PATIENT HEALTH QUESTIONNAIRE - PHQ9
1. LITTLE INTEREST OR PLEASURE IN DOING THINGS: NOT AT ALL
2. FEELING DOWN, DEPRESSED OR HOPELESS: NOT AT ALL
SUM OF ALL RESPONSES TO PHQ9 QUESTIONS 1 & 2: 0
SUM OF ALL RESPONSES TO PHQ QUESTIONS 1-9: 0
2. FEELING DOWN, DEPRESSED OR HOPELESS: NOT AT ALL
SUM OF ALL RESPONSES TO PHQ QUESTIONS 1-9: 0
1. LITTLE INTEREST OR PLEASURE IN DOING THINGS: NOT AT ALL
SUM OF ALL RESPONSES TO PHQ QUESTIONS 1-9: 0
SUM OF ALL RESPONSES TO PHQ QUESTIONS 1-9: 0

## 2025-04-22 ENCOUNTER — OFFICE VISIT (OUTPATIENT)
Dept: FAMILY MEDICINE CLINIC | Age: 45
End: 2025-04-22
Payer: COMMERCIAL

## 2025-04-22 VITALS
OXYGEN SATURATION: 97 % | WEIGHT: 315 LBS | HEART RATE: 58 BPM | BODY MASS INDEX: 42.66 KG/M2 | DIASTOLIC BLOOD PRESSURE: 70 MMHG | SYSTOLIC BLOOD PRESSURE: 116 MMHG | RESPIRATION RATE: 16 BRPM | HEIGHT: 72 IN | TEMPERATURE: 98 F

## 2025-04-22 DIAGNOSIS — Z00.00 WELL ADULT EXAM: Primary | ICD-10-CM

## 2025-04-22 DIAGNOSIS — I10 ESSENTIAL HYPERTENSION: ICD-10-CM

## 2025-04-22 PROCEDURE — 99396 PREV VISIT EST AGE 40-64: CPT | Performed by: FAMILY MEDICINE

## 2025-04-22 PROCEDURE — 3074F SYST BP LT 130 MM HG: CPT | Performed by: FAMILY MEDICINE

## 2025-04-22 PROCEDURE — 3078F DIAST BP <80 MM HG: CPT | Performed by: FAMILY MEDICINE

## 2025-04-22 RX ORDER — LISINOPRIL 40 MG/1
40 TABLET ORAL DAILY
Qty: 90 TABLET | Refills: 3 | Status: SHIPPED | OUTPATIENT
Start: 2025-04-22

## 2025-04-22 RX ORDER — HYDROCHLOROTHIAZIDE 25 MG/1
25 TABLET ORAL DAILY
Qty: 90 TABLET | Refills: 3 | Status: SHIPPED | OUTPATIENT
Start: 2025-04-22

## 2025-04-22 SDOH — ECONOMIC STABILITY: FOOD INSECURITY: WITHIN THE PAST 12 MONTHS, YOU WORRIED THAT YOUR FOOD WOULD RUN OUT BEFORE YOU GOT MONEY TO BUY MORE.: NEVER TRUE

## 2025-04-22 SDOH — ECONOMIC STABILITY: FOOD INSECURITY: WITHIN THE PAST 12 MONTHS, THE FOOD YOU BOUGHT JUST DIDN'T LAST AND YOU DIDN'T HAVE MONEY TO GET MORE.: NEVER TRUE

## 2025-05-28 DIAGNOSIS — I10 ESSENTIAL HYPERTENSION: ICD-10-CM

## 2025-05-28 RX ORDER — HYDROCHLOROTHIAZIDE 25 MG/1
25 TABLET ORAL DAILY
Qty: 90 TABLET | Refills: 3 | Status: SHIPPED | OUTPATIENT
Start: 2025-05-28

## 2025-05-28 RX ORDER — LISINOPRIL 40 MG/1
40 TABLET ORAL DAILY
Qty: 90 TABLET | Refills: 3 | Status: SHIPPED | OUTPATIENT
Start: 2025-05-28